# Patient Record
Sex: FEMALE | Race: WHITE | Employment: FULL TIME | URBAN - METROPOLITAN AREA
[De-identification: names, ages, dates, MRNs, and addresses within clinical notes are randomized per-mention and may not be internally consistent; named-entity substitution may affect disease eponyms.]

---

## 2017-02-03 ENCOUNTER — OFFICE VISIT (OUTPATIENT)
Dept: ALLERGY | Facility: CLINIC | Age: 32
End: 2017-02-03
Payer: COMMERCIAL

## 2017-02-03 VITALS
HEART RATE: 75 BPM | HEIGHT: 65 IN | BODY MASS INDEX: 24.43 KG/M2 | SYSTOLIC BLOOD PRESSURE: 108 MMHG | DIASTOLIC BLOOD PRESSURE: 72 MMHG | OXYGEN SATURATION: 98 % | WEIGHT: 146.6 LBS

## 2017-02-03 DIAGNOSIS — J45.41 MODERATE PERSISTENT ASTHMA WITH ACUTE EXACERBATION: Primary | ICD-10-CM

## 2017-02-03 DIAGNOSIS — M05.79 SEROPOSITIVE RHEUMATOID ARTHRITIS OF MULTIPLE SITES (H): Primary | ICD-10-CM

## 2017-02-03 DIAGNOSIS — J30.81 NON-SEASONAL ALLERGIC RHINITIS DUE TO ANIMAL HAIR AND DANDER: ICD-10-CM

## 2017-02-03 DIAGNOSIS — Z79.890 NEED FOR PROPHYLACTIC HORMONE REPLACEMENT THERAPY (POSTMENOPAUSAL): ICD-10-CM

## 2017-02-03 LAB
ALT SERPL W P-5'-P-CCNC: 34 U/L (ref 0–50)
AST SERPL W P-5'-P-CCNC: 21 U/L (ref 0–45)
CRP SERPL-MCNC: <2.9 MG/L (ref 0–8)
ERYTHROCYTE [DISTWIDTH] IN BLOOD BY AUTOMATED COUNT: 11.9 % (ref 10–15)
ERYTHROCYTE [SEDIMENTATION RATE] IN BLOOD BY WESTERGREN METHOD: 6 MM/H (ref 0–20)
FEF 25/75: NORMAL
FEV-1: NORMAL
FEV1/FVC: NORMAL
FVC: NORMAL
HCT VFR BLD AUTO: 40.5 % (ref 35–47)
HGB BLD-MCNC: 13.8 G/DL (ref 11.7–15.7)
MCH RBC QN AUTO: 32.5 PG (ref 26.5–33)
MCHC RBC AUTO-ENTMCNC: 34.1 G/DL (ref 31.5–36.5)
MCV RBC AUTO: 96 FL (ref 78–100)
PLATELET # BLD AUTO: 268 10E9/L (ref 150–450)
RBC # BLD AUTO: 4.24 10E12/L (ref 3.8–5.2)
WBC # BLD AUTO: 9.5 10E9/L (ref 4–11)

## 2017-02-03 PROCEDURE — 94010 BREATHING CAPACITY TEST: CPT | Performed by: ALLERGY & IMMUNOLOGY

## 2017-02-03 PROCEDURE — 99214 OFFICE O/P EST MOD 30 MIN: CPT | Mod: 25 | Performed by: ALLERGY & IMMUNOLOGY

## 2017-02-03 PROCEDURE — 84450 TRANSFERASE (AST) (SGOT): CPT

## 2017-02-03 PROCEDURE — 85652 RBC SED RATE AUTOMATED: CPT

## 2017-02-03 PROCEDURE — 85027 COMPLETE CBC AUTOMATED: CPT

## 2017-02-03 PROCEDURE — 86140 C-REACTIVE PROTEIN: CPT

## 2017-02-03 PROCEDURE — 84460 ALANINE AMINO (ALT) (SGPT): CPT

## 2017-02-03 PROCEDURE — 36415 COLL VENOUS BLD VENIPUNCTURE: CPT

## 2017-02-03 RX ORDER — MONTELUKAST SODIUM 10 MG/1
10 TABLET ORAL AT BEDTIME
Qty: 30 TABLET | Refills: 3 | Status: SHIPPED | OUTPATIENT
Start: 2017-02-03 | End: 2018-01-08

## 2017-02-03 RX ORDER — FLUTICASONE PROPIONATE AND SALMETEROL XINAFOATE 230; 21 UG/1; UG/1
2 AEROSOL, METERED RESPIRATORY (INHALATION) 2 TIMES DAILY
Qty: 1 INHALER | Refills: 0 | Status: SHIPPED | OUTPATIENT
Start: 2017-02-03 | End: 2017-03-31

## 2017-02-03 ASSESSMENT — ENCOUNTER SYMPTOMS
WHEEZING: 0
RHINORRHEA: 1
SINUS PRESSURE: 0
ADENOPATHY: 0
EYE REDNESS: 0
EYE ITCHING: 0
EYE DISCHARGE: 0
SHORTNESS OF BREATH: 0
COUGH: 1
CHILLS: 0
FACIAL SWELLING: 0
NAUSEA: 0
CHEST TIGHTNESS: 0
VOMITING: 0
DIARRHEA: 0
FEVER: 0
HEADACHES: 0
ACTIVITY CHANGE: 0
ARTHRALGIAS: 0
MYALGIAS: 0

## 2017-02-03 NOTE — Clinical Note
My Asthma Action Plan  Name: Suha Caldera   YOB: 1985  Date: 02/03/2017   My doctor: [facility]   My clinic: [facility]      My Control Medicine: Fluticasone+salmeterol (Advair) -  /21 mcg 2 puffs twice a day montelukast 10 mg by mouth once a day     My Rescue Medicine: Albuterol (Proair/Ventolin/Proventil) HFA        Dose: 2 puffs every 4 hours as needed    My Asthma Severity: moderate persistent  Avoid your asthma triggers: smoke, upper respiratory infections, animal dander and exercise or sports        GREEN ZONE   Good Control    I feel good    No cough or wheeze    Can work, sleep and play without asthma symptoms       Take your asthma control medicine every day.     1. If exercise triggers your asthma, take your rescue medication    15 minutes before exercise or sports, and    During exercise if you have asthma symptoms  2. Spacer to use with inhaler: If you have a spacer, make sure to use it with your inhaler             YELLOW ZONE Getting Worse  I have ANY of these:    I do not feel good    Cough or wheeze    Chest feels tight    Wake up at night   1. Keep taking your Green Zone medications  2. Start taking your rescue medicine:    every 20 minutes for up to 1 hour. Then every 4 hours for 24-48 hours.  3. If you stay in the Yellow Zone for more than 12-24 hours, contact your doctor.             RED ZONE Medical Alert - Get Help  I have ANY of these:    I feel awful    Medicine is not helping    Breathing getting harder    Trouble walking or talking    Nose opens wide to breathe       1. Take your rescue medicine NOW  2. If your provider has prescribed an oral steroid medicine, start taking it NOW  3. Call your doctor NOW  4. If you are still in the Red Zone after 20 minutes and you have not reached your doctor:    Take your rescue medicine again and    Call 911 or go to the emergency room right away    See your regular doctor within 2 weeks of an  Emergency Room or Urgent Care visit for follow-up treatment.        The above medication may be given at school or day care?:  N/A (Adult Patient)  Child can carry and use inhaler(s) at school with approval of school nurse?: Yes and N/A (Adult Patient)    Electronically signed by: Kofi Broussard, November 11, 2016    Annual Reminders:  Meet with Asthma Educator,  Flu Shot in the Fall, consider Pneumonia Vaccination for patients with asthma (aged 19 and older).    Pharmacy: [id number] [place] [phone]                    Asthma Triggers  How To Control Things That Make Your Asthma Worse    Triggers are things that make your asthma worse.  Look at the list below to help you find your triggers and what you can do about them.  You can help prevent asthma flare-ups by staying away from your triggers.      Trigger                                                          What you can do   Cigarette Smoke  Tobacco smoke can make asthma worse. Do not allow smoking in your home, car or around you.  Be sure no one smokes at a child s day care or school.  If you smoke, ask your health care provider for ways to help you quit.  Ask family members to quit too.  Ask your health care provider for a referral to Quit Plan to help you quit smoking, or call 8-740-586-PLAN.     Colds, Flu, Bronchitis  These are common triggers of asthma. Wash your hands often.  Don t touch your eyes, nose or mouth.  Get a flu shot every year.     Dust Mites  These are tiny bugs that live in cloth or carpet. They are too small to see. Wash sheets and blankets in hot water every week.   Encase pillows and mattress in dust mite proof covers.  Avoid having carpet if you can. If you have carpet, vacuum weekly.   Use a dust mask and HEPA vacuum.   Pollen and Outdoor Mold  Some people are allergic to trees, grass, or weed pollen, or molds. Try to keep your windows closed.  Limit time out doors when pollen count is high.   Ask you  health care provider about taking medicine during allergy season.     Animal Dander  Some people are allergic to skin flakes, urine or saliva from pets with fur or feathers. Keep pets with fur or feathers out of your home.    If you can t keep the pet outdoors, then keep the pet out of your bedroom.  Keep the bedroom door closed.  Keep pets off cloth furniture and away from stuffed toys.     Mice, Rats, and Cockroaches  Some people are allergic to the waste from these pests.   Cover food and garbage.  Clean up spills and food crumbs.  Store grease in the refrigerator.   Keep food out of the bedroom.   Indoor Mold  This can be a trigger if your home has high moisture. Fix leaking faucets, pipes, or other sources of water.   Clean moldy surfaces.  Dehumidify basement if it is damp and smelly.   Smoke, Strong Odors, and Sprays  These can reduce air quality. Stay away from strong odors and sprays, such as perfume, powder, hair spray, paints, smoke incense, paint, cleaning products, candles and new carpet.   Exercise or Sports  Some people with asthma have this trigger. Be active!  Ask your doctor about taking medicine before sports or exercise to prevent symptoms.    Warm up for 5-10 minutes before and after sports or exercise.     Other Triggers of Asthma  Cold air:  Cover your nose and mouth with a scarf.  Sometimes laughing or crying can be a trigger.  Some medicines and food can trigger asthma.

## 2017-02-03 NOTE — PROGRESS NOTES
SUBJECTIVE:                                                               Suha Caldera presents today to our Allergy Clinic at [facility] for a follow up visit.  As you know, she is a 31 year old female with asthma and allergic rhinitis.  In regards to allergic rhinitis, she has been using Qvar 80 mcg 1 puff in each nostril once a day, azelastine on as needed basis, and cetirizine on an as-needed basis. Has mild nasal congestion and clear rhinorrhea in light of a viral infection. But otherwise is doing well.   The patient denies interval sinusitis symptoms of fever, facial pain or purulent rhinorrhea.  She states that her pets do not have access into her bedroom.    In regards to her asthma, she is 7/7 days compliant with Advair 230/21 mcg 2 puffs twice a day, montelukast 10 mg by mouth daily and albuterol inhaler as needed. She has improved. Uses albuterol less than twice a week. Had one night awakening due to chest symptoms, approximately 3 weeks ago. Despite having some mild occasional cough with her viral infection, she denies chest tightness, wheezing shortness of breath. No ED/urgent care/PCP visits for chest symptoms since the last time she was seen in our clinic. No additional oral steroids were taken since she was seen in our clinic in November 2016.    Patient Active Problem List   Diagnosis     Moderate persistent asthma     AR (allergic rhinitis)     Rheumatoid arthritis (H)     Headache     Migraine without aura       Past Medical History   Diagnosis Date     RA (rheumatoid arthritis) (H)       Problem (# of Occurrences) Relation (Name,Age of Onset)    Alcoholism (1) Father    Bronchitis (1) Mother    Lupus (1) Mother        Past Surgical History   Procedure Laterality Date     Appendectomy       Social History     Social History     Marital Status:      Spouse Name: N/A     Number of Children: N/A     Years of Education: N/A     Social History Main Topics     Smoking status:  Never Smoker      Smokeless tobacco: Never Used     Alcohol Use: 0.0 oz/week     0 Standard drinks or equivalent per week      Comment: socially     Drug Use: No     Sexual Activity: Not Asked     Other Topics Concern     None     Social History Narrative    ENVIRONMENTAL HISTORY: [place] The home is heated with a forced air and gas furnace. It does have central air conditioning. The patient's bedroom is furnished with hard jennie in bedroom, allergen mattress cover and allergen pillowcase cover.  Pets inside the house include 1 bunny, 1 cat and 1 dog. There is not history of cockroach or mice infestation. There are no smokers in the house.  The house does not have a damp basement.                Review of Systems   Constitutional: Negative for fever, chills and activity change.   HENT: Positive for congestion and rhinorrhea. Negative for ear discharge, facial swelling, nosebleeds, postnasal drip, sinus pressure and sneezing.    Eyes: Negative for discharge, redness and itching.   Respiratory: Positive for cough. Negative for chest tightness, shortness of breath and wheezing.    Cardiovascular: Negative for chest pain.   Gastrointestinal: Negative for nausea, vomiting and diarrhea.   Musculoskeletal: Negative for myalgias and arthralgias.   Skin: Negative for rash.   Allergic/Immunologic: Positive for environmental allergies.   Neurological: Negative for headaches.   Hematological: Negative for adenopathy.   Psychiatric/Behavioral: Negative for behavioral problems and self-injury.           Current outpatient prescriptions:      fluticasone-salmeterol (ADVAIR-HFA) 230-21 MCG/ACT inhaler, Inhale 2 puffs into the lungs 2 times daily, Disp: 1 Inhaler, Rfl: 0     montelukast (SINGULAIR) 10 MG tablet, Take 1 tablet (10 mg) by mouth At Bedtime, Disp: 30 tablet, Rfl: 3     beclomethasone (QVAR) 80 MCG/ACT Inhaler, 1 puff into both nares twice daily with adaptor Needs to be  "seen for further refills, Disp: 1 Inhaler, Rfl: 1     cetirizine (ZYRTEC) 10 MG tablet, Take 1 tablet (10 mg) by mouth every evening, Disp: 30 tablet, Rfl: 1     Abatacept (ORENCIA) 125 MG/ML SOLN, Inject 10 mLs Subcutaneous every 7 days, Disp: , Rfl:      azelastine (ASTELIN) 0.1 % nasal spray, Spray 2 sprays into both nostrils 2 times daily as needed for rhinitis, Disp: 1 Bottle, Rfl: 1     [DISCONTINUED] montelukast (SINGULAIR) 10 MG tablet, Take 1 tablet (10 mg) by mouth At Bedtime, Disp: 30 tablet, Rfl: 3     [DISCONTINUED] fluticasone-salmeterol (ADVAIR-HFA) 230-21 MCG/ACT inhaler, Inhale 2 puffs into the lungs 2 times daily, Disp: 1 Inhaler, Rfl: 0     albuterol (VENTOLIN HFA) 108 (90 BASE) MCG/ACT inhaler, Inhale 2 puffs into the lungs every 4 hours as needed 2 puffs every 4 hours for cough or wheeze.  2 puffs 15 minutes before exercise., Disp: 1 Inhaler, Rfl: 2  No current facility-administered medications for this visit.    Facility-Administered Medications Ordered in Other Visits:      lidocaine 1 % injection, , , PRN, Jonny Mayer, JEANNA CRNA, 5 mL at 04/19/15 1723  Immunization History   Administered Date(s) Administered     Hepatitis B 07/27/1998, 09/30/1998, 02/01/1999     Human Papilloma Virus 02/13/2009, 04/30/2009, 08/31/2009     No Known Allergies  OBJECTIVE:                                                                 /72 mmHg  Pulse 75  Ht 5' 4.75\" (1.645 m)  Wt 146 lb 9.6 oz (66.497 kg)  BMI 24.57 kg/m2  SpO2 98%        Physical Exam   Constitutional: She is oriented to person, place, and time. No distress.   HENT:   Head: Normocephalic and atraumatic.   Right Ear: Tympanic membrane, external ear and ear canal normal.   Left Ear: Tympanic membrane, external ear and ear canal normal.   Nose: No mucosal edema or rhinorrhea.   Mouth/Throat: Oropharynx is clear and moist and mucous membranes are normal.   Eyes: Conjunctivae are normal. Right eye exhibits no discharge. Left eye " exhibits no discharge.   Neck: Normal range of motion.   Cardiovascular: Normal rate, regular rhythm and normal heart sounds.    No murmur heard.  Pulmonary/Chest: Effort normal and breath sounds normal. No respiratory distress. She has no wheezes. She has no rales.   Musculoskeletal: Normal range of motion.   Lymphadenopathy:     She has no cervical adenopathy.   Neurological: She is alert and oriented to person, place, and time.   Skin: Skin is warm. No rash noted. She is not diaphoretic.   Psychiatric: Mood, memory and affect normal.   Nursing note and vitals reviewed.        WORKUP:     SPIROMETRY       FVC 4.21L (107% of predicted).     FEV1 3.07L (94% of predicted).     FEV1/FVC 73%     FEF 25%-75%  2.15L/s (61% of predicted).    Office spirometry performed today suggests small airway limitation.    Asthma Control Test (ACT) total score: 22     ASSESSMENT/PLAN:    No problems updated.  Problem List Items Addressed This Visit        Respiratory    1. Moderate persistent asthma - Primary  Significantly improved with proper compliance, currently on Advair 230/21 mcg 2 puffs twice a day, montelukast 10 mg by mouth once a day and albuterol as needed.  -Continue as is.  In the future, considering symptom control, we may consider omalizumab.  Current small airway limitation can be explained by ongoing viral symptoms.  She doesn't consider allergen immunotherapy at this time.    Relevant Medications    fluticasone-salmeterol (ADVAIR-HFA) 230-21 MCG/ACT inhaler    montelukast (SINGULAIR) 10 MG tablet      Other Visit Diagnoses     2. Non-seasonal allergic rhinitis due to animal hair and dander      In general, well controlled with intranasal Qvar, oral cetirizine and intranasal azelastine.  -Continue keeping pets outside of her bedroom.  -Continue medications the way they are right now.     Relevant Medications     fluticasone-salmeterol (ADVAIR-HFA) 230-21 MCG/ACT inhaler     montelukast (SINGULAIR) 10 MG tablet              Follow up in 3 months or sooner if needed.      Thank you for allowing us to participate in the care of this patient. Please feel free to contact us if there are any questions or concerns about the patient.    Disclaimer: This note consists of symbols derived from keyboarding, dictation and/or voice recognition software. As a result, there may be errors in the script that have gone undetected. Please consider this when interpreting information found in this chart.    Kofi Broussard MD   Allergy, Asthma and Immunology  [place], [place] and [place]

## 2017-02-03 NOTE — NURSING NOTE
"Chief Complaint   Patient presents with     Asthma Recheck     2 month follow up. Has had a cold X2wks       Initial /72 mmHg  Pulse 75  Ht 5' 4.75\" (1.645 m)  Wt 146 lb 9.6 oz (66.497 kg)  BMI 24.57 kg/m2  SpO2 98% Estimated body mass index is 24.57 kg/(m^2) as calculated from the following:    Height as of this encounter: 5' 4.75\" (1.645 m).    Weight as of this encounter: 146 lb 9.6 oz (66.497 kg).  BP completed using cuff size: regular    "

## 2017-02-04 ASSESSMENT — ASTHMA QUESTIONNAIRES: ACT_TOTALSCORE: 22

## 2017-02-07 DIAGNOSIS — Z79.899 LONG TERM USE OF DRUG: Primary | ICD-10-CM

## 2017-02-07 DIAGNOSIS — M06.9 RA (RHEUMATOID ARTHRITIS) (H): ICD-10-CM

## 2017-03-31 DIAGNOSIS — J45.40 MODERATE PERSISTENT ASTHMA WITHOUT COMPLICATION: Primary | ICD-10-CM

## 2017-03-31 RX ORDER — FLUTICASONE PROPIONATE AND SALMETEROL XINAFOATE 230; 21 UG/1; UG/1
2 AEROSOL, METERED RESPIRATORY (INHALATION) 2 TIMES DAILY
Qty: 1 INHALER | Refills: 2 | Status: SHIPPED | OUTPATIENT
Start: 2017-03-31 | End: 2017-09-18

## 2017-03-31 NOTE — TELEPHONE ENCOUNTER
Refilled Advair 230 per [facility] protocol.  Patient is due for a f/u appointment with Dr. Broussard in May.  Nathalie Carbajal RN

## 2017-08-02 ENCOUNTER — TRANSFERRED RECORDS (OUTPATIENT)
Dept: HEALTH INFORMATION MANAGEMENT | Facility: CLINIC | Age: 32
End: 2017-08-02

## 2017-08-02 LAB
HPV ABSTRACT: ABNORMAL
PAP-ABSTRACT: ABNORMAL

## 2017-09-18 ENCOUNTER — OFFICE VISIT (OUTPATIENT)
Dept: ALLERGY | Facility: CLINIC | Age: 32
End: 2017-09-18
Payer: COMMERCIAL

## 2017-09-18 VITALS
WEIGHT: 147.49 LBS | OXYGEN SATURATION: 98 % | HEART RATE: 75 BPM | TEMPERATURE: 97.6 F | DIASTOLIC BLOOD PRESSURE: 72 MMHG | SYSTOLIC BLOOD PRESSURE: 121 MMHG | HEIGHT: 65 IN | BODY MASS INDEX: 24.57 KG/M2

## 2017-09-18 DIAGNOSIS — J45.40 NOT WELL CONTROLLED MODERATE PERSISTENT ASTHMA: Primary | ICD-10-CM

## 2017-09-18 DIAGNOSIS — J30.81 CHRONIC ALLERGIC RHINITIS DUE TO ANIMAL HAIR AND DANDER: ICD-10-CM

## 2017-09-18 LAB
FEF 25/75: NORMAL
FEV-1: NORMAL
FEV1/FVC: NORMAL
FVC: NORMAL

## 2017-09-18 PROCEDURE — 94060 EVALUATION OF WHEEZING: CPT | Performed by: ALLERGY & IMMUNOLOGY

## 2017-09-18 PROCEDURE — 99214 OFFICE O/P EST MOD 30 MIN: CPT | Mod: 25 | Performed by: ALLERGY & IMMUNOLOGY

## 2017-09-18 RX ORDER — FLUTICASONE PROPIONATE AND SALMETEROL 232; 14 UG/1; UG/1
1 POWDER, METERED RESPIRATORY (INHALATION) 2 TIMES DAILY
Qty: 1 EACH | Refills: 1 | Status: SHIPPED | OUTPATIENT
Start: 2017-09-18 | End: 2017-09-18

## 2017-09-18 RX ORDER — PREDNISONE 20 MG/1
40 TABLET ORAL DAILY
Qty: 10 TABLET | Refills: 0 | Status: SHIPPED | OUTPATIENT
Start: 2017-09-18 | End: 2017-09-23

## 2017-09-18 RX ORDER — FLUTICASONE PROPIONATE AND SALMETEROL XINAFOATE 230; 21 UG/1; UG/1
2 AEROSOL, METERED RESPIRATORY (INHALATION) 2 TIMES DAILY
Qty: 1 INHALER | Refills: 2 | Status: SHIPPED | OUTPATIENT
Start: 2017-09-18 | End: 2017-09-18

## 2017-09-18 RX ORDER — FLUTICASONE PROPIONATE AND SALMETEROL 232; 14 UG/1; UG/1
1 POWDER, METERED RESPIRATORY (INHALATION) 2 TIMES DAILY
Qty: 1 EACH | Refills: 1 | Status: SHIPPED | OUTPATIENT
Start: 2017-09-18 | End: 2017-12-04

## 2017-09-18 ASSESSMENT — ENCOUNTER SYMPTOMS
ACTIVITY CHANGE: 0
WHEEZING: 1
EYE REDNESS: 0
FEVER: 0
COUGH: 1
ARTHRALGIAS: 0
CHILLS: 0
SHORTNESS OF BREATH: 1
EYE ITCHING: 1
ADENOPATHY: 0
MYALGIAS: 0
NAUSEA: 0
SINUS PRESSURE: 0
CHEST TIGHTNESS: 1
FACIAL SWELLING: 0
DIARRHEA: 0
EYE DISCHARGE: 0
RHINORRHEA: 1
VOMITING: 0
HEADACHES: 0

## 2017-09-18 NOTE — PROGRESS NOTES
SUBJECTIVE:                                                               Suha Jean presents today to our Allergy Clinic at  for a follow up visit.  As you know, she is a 32 year old female with with asthma and allergic rhinitis.  As you know, she is a 32-year-old female with asthma and allergic rhinitis.  In regards to her asthma, she has been using Singulair 10 mg by mouth daily. She could not afford Advair. It was $250 for her. She stopped using it about a month ago. Since the last time, February 2017, she did pretty well; however, about 2-3 days ago she developed upper respiratory infection symptoms that triggered cough, chest tightness, shortness of breath and wheezing. She has chest tightness. Pretty much on a daily basis. Uses albuterol daily.    In regards to her allergic rhinitis symptoms, she is using Qvar 80 mcg 1 puff in each nostril once a day, azelastine on as needed basis (rarely), and cetirizine almost daily. Besides having some mild rhinorrhea and sneezing, she denies any other symptoms. No interval sinusitis symptoms or fever, facial pain or purulent rhinorrhea    Patient Active Problem List   Diagnosis     Moderate persistent asthma     Chronic allergic rhinitis due to animal hair and dander     Rheumatoid arthritis (H)     Headache     Migraine without aura       Past Medical History:   Diagnosis Date     RA (rheumatoid arthritis) (H)       Problem (# of Occurrences) Relation (Name,Age of Onset)    Alcoholism (1) Father    Bronchitis (1) Mother    Lupus (1) Mother        Past Surgical History:   Procedure Laterality Date     APPENDECTOMY       Social History     Social History     Marital status:      Spouse name: N/A     Number of children: N/A     Years of education: N/A     Social History Main Topics     Smoking status: Never Smoker     Smokeless tobacco: Never Used     Alcohol use 0.0 oz/week     0 Standard drinks or equivalent per week      Comment: socially     Drug use: No      Sexual activity: Not Asked     Other Topics Concern     None     Social History Narrative    ENVIRONMENTAL HISTORY: [place] The home is heated with a forced air and gas furnace. It does have central air conditioning. The patient's bedroom is furnished with hard jennie in bedroom, allergen mattress cover and allergen pillowcase cover.  Pets inside the house include 1 bunny, 1 cat and 1 dog. There is not history of cockroach or mice infestation. There are no smokers in the house.  The house does not have a damp basement.                Review of Systems   Constitutional: Negative for activity change, chills and fever.   HENT: Positive for rhinorrhea and sneezing. Negative for congestion, ear discharge, facial swelling, nosebleeds, postnasal drip and sinus pressure.    Eyes: Positive for itching. Negative for discharge and redness.   Respiratory: Positive for cough, chest tightness, shortness of breath and wheezing.    Cardiovascular: Negative for chest pain.   Gastrointestinal: Negative for diarrhea, nausea and vomiting.   Musculoskeletal: Negative for arthralgias and myalgias.   Skin: Negative for rash.   Neurological: Negative for headaches.   Hematological: Negative for adenopathy.   Psychiatric/Behavioral: Negative for behavioral problems and self-injury.           Current Outpatient Prescriptions:      levonorgestrel (MIRENA) 20 MCG/24HR IUD, 1 Device by Intrauterine route, Disp: , Rfl:      Fluticasone-Salmeterol 232-14 MCG/ACT AEPB, Inhale 1 puff into the lungs 2 times daily, Disp: 1 each, Rfl: 1     predniSONE (DELTASONE) 20 MG tablet, Take 2 tablets (40 mg) by mouth daily for 5 days, Disp: 10 tablet, Rfl: 0     montelukast (SINGULAIR) 10 MG tablet, Take 1 tablet (10 mg) by mouth At Bedtime, Disp: 30 tablet, Rfl: 3     beclomethasone (QVAR) 80 MCG/ACT Inhaler, 1 puff into both nares twice daily with adaptor Needs to be seen for further refills, Disp: 1 Inhaler,  "Rfl: 1     cetirizine (ZYRTEC) 10 MG tablet, Take 1 tablet (10 mg) by mouth every evening, Disp: 30 tablet, Rfl: 1     albuterol (VENTOLIN HFA) 108 (90 BASE) MCG/ACT inhaler, Inhale 2 puffs into the lungs every 4 hours as needed 2 puffs every 4 hours for cough or wheeze.  2 puffs 15 minutes before exercise., Disp: 1 Inhaler, Rfl: 2     Abatacept (ORENCIA) 125 MG/ML SOLN, Inject 10 mLs Subcutaneous every 7 days, Disp: , Rfl:      [DISCONTINUED] Fluticasone-Salmeterol 232-14 MCG/ACT AEPB, Inhale 1 puff into the lungs 2 times daily, Disp: 1 each, Rfl: 1     [DISCONTINUED] fluticasone-salmeterol (ADVAIR-HFA) 230-21 MCG/ACT inhaler, Inhale 2 puffs into the lungs 2 times daily, Disp: 1 Inhaler, Rfl: 2     [DISCONTINUED] fluticasone-salmeterol (ADVAIR-HFA) 230-21 MCG/ACT inhaler, Inhale 2 puffs into the lungs 2 times daily (Patient not taking: Reported on 9/18/2017), Disp: 1 Inhaler, Rfl: 2     azelastine (ASTELIN) 0.1 % nasal spray, Spray 2 sprays into both nostrils 2 times daily as needed for rhinitis (Patient not taking: Reported on 9/18/2017), Disp: 1 Bottle, Rfl: 1  No current facility-administered medications for this visit.     Facility-Administered Medications Ordered in Other Visits:      lidocaine 1 % injection, , , PRN, Jonny Mayer E, APRN CRNA, 5 mL at 04/19/15 1723  Immunization History   Administered Date(s) Administered     HPV 02/13/2009, 04/30/2009, 08/31/2009     HepB 07/27/1998, 09/30/1998, 02/01/1999     No Known Allergies  OBJECTIVE:                                                                 /72 (BP Location: Right arm, Patient Position: Sitting, Cuff Size: Adult Regular)  Pulse 75  Temp 97.6  F (36.4  C) (Tympanic)  Ht 5' 4.96\" (1.65 m)  Wt 147 lb 7.8 oz (66.9 kg)  SpO2 98%  BMI 24.57 kg/m2        Physical Exam   Constitutional: She is oriented to person, place, and time. No distress.   HENT:   Head: Normocephalic and atraumatic.   Right Ear: Tympanic membrane, external ear and ear " canal normal.   Left Ear: Tympanic membrane, external ear and ear canal normal.   Nose: No mucosal edema or rhinorrhea.   Mouth/Throat: Oropharynx is clear and moist and mucous membranes are normal.   Eyes: Conjunctivae are normal. Right eye exhibits no discharge. Left eye exhibits no discharge.   Neck: Normal range of motion.   Cardiovascular: Normal rate, regular rhythm and normal heart sounds.    No murmur heard.  Pulmonary/Chest: Effort normal and breath sounds normal. No respiratory distress. She has no wheezes. She has no rales.   Musculoskeletal: Normal range of motion.   Lymphadenopathy:     She has no cervical adenopathy.   Neurological: She is alert and oriented to person, place, and time.   Skin: Skin is warm. No rash noted. She is not diaphoretic.   Psychiatric: Mood, memory and affect normal.   Nursing note and vitals reviewed.      WORKUP:  SPIROMETRY  initial FVC 2.68L (68% of predicted); after bronchodilator 3.85L (+43% change)   initial FEV1 1.73L (53% of predicted); after bronchodilator 2.87L (+65% change)  initial FEV1/FVC 65 %; after bronchodilator 74%  initial FEF 25%-75% 1.11L/s (31% of predicted); after bronchodilator 4.18L/s (+275% change)      The office spirometry performed today suggests moderate obstruction with significant reversibility after nebulized albuterol. Prebronchodilator study is worse comparing with previous ones.      Asthma Control Test (ACT) total score: 15        ASSESSMENT/PLAN:    Problem List Items Addressed This Visit        Respiratory    1. Chronic allergic rhinitis due to animal hair and dander  Currently seems to be well controlled with Qvar 80  g 1 puff in each nostril once a day, azelastine on as needed basis and cetirizine on an as needed basis. Apparently, she needs cetirizine daily.  -Continue avoidance measures.  -Continue the same medication regimen. The patient does not consider allergen immunotherapy.    Relevant Medications    Fluticasone-Salmeterol  232-14 MCG/ACT AEPB      Other Visit Diagnoses     2. Not well controlled moderate persistent asthma    -  Primary  -Continue with montelukast 10 mg by mouth daily.  -Start Air Duo Respiclick 232 mcg/14mcg 1 inhalation twice a day. The patient was encouraged to call us in case if controlling medication cost is an issue so we would be able to explore other options.  --Start prednisone 40 mg by mouth once a day for 3 days. Can extend up to 5 days depending on symptoms.     Relevant Medications    levonorgestrel (MIRENA) 20 MCG/24HR IUD    Fluticasone-Salmeterol 232-14 MCG/ACT AEPB    predniSONE (DELTASONE) 20 MG tablet    Other Relevant Orders    ALBUTEROL UNIT DOSE, 1 MG (Completed)    BRONCHODILATION RESPONSE, PRE/POST ADMIN (Completed)          Follow up in 4 weeks or sooner if needed.    Thank you for allowing us to participate in the care of this patient. Please feel free to contact us if there are any questions or concerns about the patient.    Disclaimer: This note consists of symbols derived from keyboarding, dictation and/or voice recognition software. As a result, there may be errors in the script that have gone undetected. Please consider this when interpreting information found in this chart.    Kofi Broussard MD, [facility]  Allergy, Asthma and Immunology  [place], [place] and [place]

## 2017-09-18 NOTE — NURSING NOTE
The following nebulizer treatment was given:     MEDICATION: Albuterol Sulfate 2.5 mg  : [employer]  LOT #: [id number]  EXPIRATION DATE:  Feb 2019  NDC # 4098-6521-26

## 2017-09-18 NOTE — PATIENT INSTRUCTIONS
--Start prednisone 40 mg by mouth once a day for 3 days. Can extend up to 5 days depending on symptoms.   Start Air Duo Respiclick 232 mcg/14mcg 1 inhalation twice a day. Rinse your mouth after using it.   Continue QVAR daily, and cetirizine and azelastine as needed.

## 2017-09-18 NOTE — NURSING NOTE
"Chief Complaint   Patient presents with     Allergy Recheck       Initial /72 (BP Location: Right arm, Patient Position: Sitting, Cuff Size: Adult Regular)  Pulse 75  Temp 97.6  F (36.4  C) (Tympanic)  Ht 5' 4.96\" (1.65 m)  Wt 147 lb 7.8 oz (66.9 kg)  SpO2 98%  BMI 24.57 kg/m2 Estimated body mass index is 24.57 kg/(m^2) as calculated from the following:    Height as of this encounter: 5' 4.96\" (1.65 m).    Weight as of this encounter: 147 lb 7.8 oz (66.9 kg).  Medication Reconciliation: complete    "

## 2017-09-18 NOTE — MR AVS SNAPSHOT
"              After Visit Summary   9/18/2017    Suha Jean    MRN: [id number]           Patient Information     Date Of Birth          1985        Visit Information        Provider Department      9/18/2017 8:20 AM Kofi Broussard MD [facility]        Today's Diagnoses     Not well controlled moderate persistent asthma    -  1    Chronic allergic rhinitis due to animal hair and dander          Care Instructions    --Start prednisone 40 mg by mouth once a day for 3 days. Can extend up to 5 days depending on symptoms.   Start Air Duo Respiclick 232 mcg/14mcg 1 inhalation twice a day. Rinse your mouth after using it.   Continue QVAR daily, and cetirizine and azelastine as needed.           Follow-ups after your visit        Follow-up notes from your care team     Return in about 4 weeks (around 10/16/2017), or if symptoms worsen or fail to improve, for rhinitis follow up, asthma follow up.      Who to contact     If you have questions or need follow up information about today's clinic visit or your schedule please contact [facility] directly at [phone].  Normal or non-critical lab and imaging results will be communicated to you by Widdlehart, letter or phone within 4 business days after the clinic has received the results. If you do not hear from us within 7 days, please contact the clinic through Jack in the Boxt or phone. If you have a critical or abnormal lab result, we will notify you by phone as soon as possible.  Submit refill requests through [employer] or call your pharmacy and they will forward the refill request to us. Please allow 3 business days for your refill to be completed.          Additional Information About Your Visit        MyChart Information     [employer] lets you send messages to your doctor, view your test results, renew your prescriptions, schedule appointments and more. To sign up, go to [email or web address] . Click on \"Log in\" on the left side of the screen, " "which will take you to the Welcome page. Then click on \"Sign up Now\" on the right side of the page.     You will be asked to enter the access code listed below, as well as some personal information. Please follow the directions to create your username and password.     Your access code is: [email or web address]  Expires: 2017  8:54 AM     Your access code will  in 90 days. If you need help or a new code, please call your [place] clinic or [phone].        Care EveryWhere ID     This is your Care EveryWhere ID. This could be used by other organizations to access your [place] medical records  [id number]        Your Vitals Were     Pulse Temperature Height Pulse Oximetry BMI (Body Mass Index)       75 97.6  F (36.4  C) (Tympanic) 5' 4.96\" (1.65 m) 98% 24.57 kg/m2        Blood Pressure from Last 3 Encounters:   17 121/72   17 108/72   16 110/72    Weight from Last 3 Encounters:   17 147 lb 7.8 oz (66.9 kg)   17 146 lb 9.6 oz (66.5 kg)   16 145 lb 12.8 oz (66.1 kg)              We Performed the Following     ALBUTEROL UNIT DOSE, 1 MG     BRONCHODILATION RESPONSE, PRE/POST ADMIN          Today's Medication Changes          These changes are accurate as of: 17  8:54 AM.  If you have any questions, ask your nurse or doctor.               Start taking these medicines.        Dose/Directions    Fluticasone-Salmeterol 232-14 MCG/ACT Aepb   Used for:  Not well controlled moderate persistent asthma   Replaces:  fluticasone-salmeterol 230-21 MCG/ACT inhaler   Started by:  Kofi Broussard MD        Dose:  1 puff   Inhale 1 puff into the lungs 2 times daily   Quantity:  1 each   Refills:  1       predniSONE 20 MG tablet   Commonly known as:  DELTASONE   Used for:  Not well controlled moderate persistent asthma   Started by:  Kofi Broussard MD        Dose:  40 mg   Take 2 tablets (40 mg) by mouth daily for 5 days   Quantity:  10 tablet   Refills:  0         Stop taking these " medicines if you haven't already. Please contact your care team if you have questions.     fluticasone-salmeterol 230-21 MCG/ACT inhaler   Commonly known as:  ADVAIR-HFA   Replaced by:  Fluticasone-Salmeterol 232-14 MCG/ACT Aepb   Stopped by:  Kofi Broussard MD                Where to get your medicines      These medications were sent to [id number] - [place], [place] - [address]  [address] [address]    Hours:  Closed 10-16-08 business to [facility] Phone:  [phone]     Fluticasone-Salmeterol 232-14 MCG/ACT Aepb    predniSONE 20 MG tablet                Primary Care Provider Office Phone #    [facility] [phone]       No address on file        Equal Access to Services     BRIGIDA RIZVI : Maurice Sidhu, wachauncey vick, akosua cihldalpeg harrison, glen alvarado. So [facility] [phone].    ATENCIÓN: Si habla español, tiene a roque disposición servicios gratuitos de asistencia lingüística. [facility] [phone].    We comply with applicable federal civil rights laws and [place] laws. We do not discriminate on the basis of race, color, national origin, age, disability sex, sexual orientation or gender identity.            Thank you!     Thank you for choosing [facility]  for your care. Our goal is always to provide you with excellent care. Hearing back from our patients is one way we can continue to improve our services. Please take a few minutes to complete the written survey that you may receive in the mail after your visit with us. Thank you!             Your Updated Medication List - Protect others around you: Learn how to safely use, store and throw away your medicines at www.disposemymeds.org.          This list is accurate as of: 9/18/17  8:54 AM.  Always use your most recent med list.                   Brand Name Dispense Instructions for use Diagnosis    albuterol 108 (90 BASE) MCG/ACT  Inhaler    VENTOLIN HFA    1 Inhaler    Inhale 2 puffs into the lungs every 4 hours as needed 2 puffs every 4 hours for cough or wheeze.  2 puffs 15 minutes before exercise.    Severe persistent asthma       azelastine 0.1 % spray    ASTELIN    1 Bottle    Spray 2 sprays into both nostrils 2 times daily as needed for rhinitis    Moderate persistent asthma with acute exacerbation       beclomethasone 80 MCG/ACT Inhaler    QVAR    1 Inhaler    1 puff into both nares twice daily with adaptor Needs to be seen for further refills    Moderate persistent asthma without complication       cetirizine 10 MG tablet    zyrTEC    30 tablet    Take 1 tablet (10 mg) by mouth every evening    Acute sinusitis with symptoms > 10 days       Fluticasone-Salmeterol 232-14 MCG/ACT Aepb     1 each    Inhale 1 puff into the lungs 2 times daily    Not well controlled moderate persistent asthma       levonorgestrel 20 MCG/24HR IUD    MIRENA     1 Device by Intrauterine route    Not well controlled moderate persistent asthma       montelukast 10 MG tablet    SINGULAIR    30 tablet    Take 1 tablet (10 mg) by mouth At Bedtime        ORENCIA 125 MG/ML Sosy   Generic drug:  Abatacept      Inject 10 mLs Subcutaneous every 7 days        predniSONE 20 MG tablet    DELTASONE    10 tablet    Take 2 tablets (40 mg) by mouth daily for 5 days    Not well controlled moderate persistent asthma

## 2017-09-19 ASSESSMENT — ASTHMA QUESTIONNAIRES: ACT_TOTALSCORE: 15

## 2017-12-04 DIAGNOSIS — J45.40 NOT WELL CONTROLLED MODERATE PERSISTENT ASTHMA: ICD-10-CM

## 2017-12-04 RX ORDER — FLUTICASONE PROPIONATE AND SALMETEROL 232; 14 UG/1; UG/1
1 POWDER, METERED RESPIRATORY (INHALATION) 2 TIMES DAILY
Qty: 1 EACH | Refills: 1 | Status: SHIPPED | OUTPATIENT
Start: 2017-12-04 | End: 2018-01-03

## 2017-12-04 NOTE — TELEPHONE ENCOUNTER
Fluticasone-Salmeterol 232-14 MCG/ACT        Last Written Prescription Date: 9/18/17  Last Fill Quantity: 1 each, # refills: 1  Last Office Visit with [facility], P or [facility] prescribing provider:  9/18/17  Future Office Visit:   No Follow-up appointment scheduled.    Date of Last Asthma Action Plan Letter:   Asthma Action Plan Q1 Year    Topic Date Due     Asthma Action Plan - yearly  02/03/2018      Asthma Control Test:   ACT Total Scores 9/18/2017   ACT TOTAL SCORE -   ASTHMA ER VISITS -   ASTHMA HOSPITALIZATIONS -   ACT TOTAL SCORE (Goal Greater than or Equal to 20) 15   In the past 12 months, how many times did you visit the emergency room for your asthma without being admitted to the hospital? 0   In the past 12 months, how many times were you hospitalized overnight because of your asthma? 0       Date of Last Spirometry Test:   No results found for this or any previous visit.    Routing refill request to provider for review/approval because:  Patient needs to be seen because:  ACT 15 on 9/18/17 office visit. 4 week or sooner follow-up recommended (10/16/17)  Please advise.    Erinn Arenas, RN

## 2017-12-04 NOTE — TELEPHONE ENCOUNTER
I will prescribe one refill. My experience is th there is a back order for this medication.   The patient was recommended for weeks follow-up. I will not prescribe further until she is seen.

## 2017-12-04 NOTE — LETTER
[facility]  Allergy & Asthma Clinic  [address] [address]  [phone]      Suha Jean  [address] [address]        December 6, 2017      Dear Suha Jean,      We received a request to refill Fluticasone-Salmeterol 232-14 MCG/ACT .  This medication request has been filled for a 1 month supply.  Our records indicate that you are due for a follow up with Dr. Broussard for your allergy and/or asthma care. Please contact our office at [phone], to schedule this appointment at your earliest convenience.        Sincerely,      Your [facility] care team

## 2017-12-06 NOTE — TELEPHONE ENCOUNTER
Mailed letter notifying patient of 1 month refill and need for f/u appointment for further refills.  Nathalie Carbajal RN

## 2017-12-29 ENCOUNTER — TELEPHONE (OUTPATIENT)
Dept: ALLERGY | Facility: CLINIC | Age: 32
End: 2017-12-29

## 2017-12-29 DIAGNOSIS — J45.40 NOT WELL CONTROLLED MODERATE PERSISTENT ASTHMA: ICD-10-CM

## 2017-12-29 DIAGNOSIS — J45.40 MODERATE PERSISTENT ASTHMA WITHOUT COMPLICATION: Primary | ICD-10-CM

## 2017-12-29 NOTE — TELEPHONE ENCOUNTER
"Last OV 9/18/17.  Please see note below from pharmacy.    Per last OV note: \"Start Air Duo Respiclick 232 mcg/14mcg 1 inhalation twice a day. The patient was encouraged to call us in case if controlling medication cost is an issue so we would be able to explore other options.\"    Please advise.  Nathalie Carbajal RN    "

## 2017-12-29 NOTE — TELEPHONE ENCOUNTER
"[facility] pharmacy faxed request stating \" pt requests new rx: generic airduo respiclick is not available , brand copay is $93.19, is there a less expensive alternative?\"    Please send in new rx    Niyah Hoang  Specialty CSS    "

## 2017-12-31 NOTE — TELEPHONE ENCOUNTER
I am not sure what the cost of Dulera would be, but I prescribed Dulera 200/5 2 puffs twice daily to be used with chamber device.

## 2018-01-03 RX ORDER — FLUTICASONE PROPIONATE AND SALMETEROL 232; 14 UG/1; UG/1
1 POWDER, METERED RESPIRATORY (INHALATION) 2 TIMES DAILY
Qty: 3 EACH | Refills: 0 | Status: SHIPPED | OUTPATIENT
Start: 2018-01-03 | End: 2018-01-08

## 2018-01-03 NOTE — TELEPHONE ENCOUNTER
Patient called back and states that fluticasone/salmeterol has been working well for her.  She said that she talked with [email or web address] and she could get a 3 month supply of generic airduo for the price of 1 inhaler if she filled thru the mail order pharmacy.  3 month Rx sent to [facility] per patient request.  Nathalie Carbajal RN

## 2018-01-03 NOTE — TELEPHONE ENCOUNTER
Pt calling stating she is needing any inhaler [facility].   [facility] day [facility]. She has an appt on 1/8     Niyah [facility]

## 2018-01-03 NOTE — TELEPHONE ENCOUNTER
Left message for patient to call back.  I just want to be sure that she wants generic [facility]? (and not Dulera? This was suggested by Dr. Broussard as an alternative med since Airduo was expensive).  I don't want the patient to end up with a 3 month supply of the wrong med.  Thanks,    Nathalie Carbajal, RN

## 2018-01-08 ENCOUNTER — OFFICE VISIT (OUTPATIENT)
Dept: ALLERGY | Facility: CLINIC | Age: 33
End: 2018-01-08
Payer: COMMERCIAL

## 2018-01-08 VITALS
OXYGEN SATURATION: 98 % | RESPIRATION RATE: 16 BRPM | DIASTOLIC BLOOD PRESSURE: 72 MMHG | SYSTOLIC BLOOD PRESSURE: 110 MMHG | BODY MASS INDEX: 26.04 KG/M2 | HEART RATE: 71 BPM | TEMPERATURE: 98.2 F | WEIGHT: 156.31 LBS

## 2018-01-08 DIAGNOSIS — H10.13 ALLERGIC CONJUNCTIVITIS, BILATERAL: ICD-10-CM

## 2018-01-08 DIAGNOSIS — J30.81 CHRONIC ALLERGIC RHINITIS DUE TO ANIMAL HAIR AND DANDER: ICD-10-CM

## 2018-01-08 DIAGNOSIS — J45.40 NOT WELL CONTROLLED MODERATE PERSISTENT ASTHMA: Primary | ICD-10-CM

## 2018-01-08 DIAGNOSIS — Z79.899 LONG TERM USE OF DRUG: ICD-10-CM

## 2018-01-08 DIAGNOSIS — M06.9 RA (RHEUMATOID ARTHRITIS) (H): ICD-10-CM

## 2018-01-08 LAB
ALBUMIN SERPL-MCNC: 4 G/DL (ref 3.4–5)
ALT SERPL W P-5'-P-CCNC: 24 U/L (ref 0–50)
AST SERPL W P-5'-P-CCNC: 17 U/L (ref 0–45)
CREAT SERPL-MCNC: 0.58 MG/DL (ref 0.52–1.04)
CRP SERPL-MCNC: <2.9 MG/L (ref 0–8)
ERYTHROCYTE [DISTWIDTH] IN BLOOD BY AUTOMATED COUNT: 11.6 % (ref 10–15)
ERYTHROCYTE [SEDIMENTATION RATE] IN BLOOD BY WESTERGREN METHOD: 6 MM/H (ref 0–20)
FEF 25/75: NORMAL
FEV-1: NORMAL
FEV1/FVC: NORMAL
FVC: NORMAL
GFR SERPL CREATININE-BSD FRML MDRD: >90 ML/MIN/1.7M2
HCT VFR BLD AUTO: 40.6 % (ref 35–47)
HGB BLD-MCNC: 14.3 G/DL (ref 11.7–15.7)
MCH RBC QN AUTO: 33.5 PG (ref 26.5–33)
MCHC RBC AUTO-ENTMCNC: 35.2 G/DL (ref 31.5–36.5)
MCV RBC AUTO: 95 FL (ref 78–100)
PLATELET # BLD AUTO: 242 10E9/L (ref 150–450)
RBC # BLD AUTO: 4.27 10E12/L (ref 3.8–5.2)
WBC # BLD AUTO: 9.2 10E9/L (ref 4–11)

## 2018-01-08 PROCEDURE — 84460 ALANINE AMINO (ALT) (SGPT): CPT | Performed by: INTERNAL MEDICINE

## 2018-01-08 PROCEDURE — 99214 OFFICE O/P EST MOD 30 MIN: CPT | Mod: 25 | Performed by: ALLERGY & IMMUNOLOGY

## 2018-01-08 PROCEDURE — 85652 RBC SED RATE AUTOMATED: CPT | Performed by: INTERNAL MEDICINE

## 2018-01-08 PROCEDURE — 82565 ASSAY OF CREATININE: CPT | Performed by: INTERNAL MEDICINE

## 2018-01-08 PROCEDURE — 85027 COMPLETE CBC AUTOMATED: CPT | Performed by: INTERNAL MEDICINE

## 2018-01-08 PROCEDURE — 82040 ASSAY OF SERUM ALBUMIN: CPT | Performed by: INTERNAL MEDICINE

## 2018-01-08 PROCEDURE — 86140 C-REACTIVE PROTEIN: CPT | Performed by: INTERNAL MEDICINE

## 2018-01-08 PROCEDURE — 94010 BREATHING CAPACITY TEST: CPT | Performed by: ALLERGY & IMMUNOLOGY

## 2018-01-08 PROCEDURE — 84450 TRANSFERASE (AST) (SGOT): CPT | Performed by: INTERNAL MEDICINE

## 2018-01-08 PROCEDURE — 36415 COLL VENOUS BLD VENIPUNCTURE: CPT | Performed by: INTERNAL MEDICINE

## 2018-01-08 RX ORDER — MONTELUKAST SODIUM 10 MG/1
10 TABLET ORAL AT BEDTIME
Qty: 90 TABLET | Refills: 3 | Status: SHIPPED | OUTPATIENT
Start: 2018-01-08

## 2018-01-08 RX ORDER — AZELASTINE HYDROCHLORIDE 0.5 MG/ML
1 SOLUTION/ DROPS OPHTHALMIC 2 TIMES DAILY PRN
Qty: 1 BOTTLE | Refills: 0 | Status: SHIPPED | OUTPATIENT
Start: 2018-01-08 | End: 2019-09-03

## 2018-01-08 RX ORDER — BUDESONIDE AND FORMOTEROL FUMARATE DIHYDRATE 160; 4.5 UG/1; UG/1
2 AEROSOL RESPIRATORY (INHALATION) 2 TIMES DAILY
Qty: 1 INHALER | Refills: 1 | Status: SHIPPED | OUTPATIENT
Start: 2018-01-08 | End: 2018-04-23

## 2018-01-08 ASSESSMENT — ENCOUNTER SYMPTOMS
VOMITING: 0
ADENOPATHY: 0
COUGH: 1
JOINT SWELLING: 0
FEVER: 0
NAUSEA: 0
CHILLS: 0
EYE ITCHING: 1
HEADACHES: 0
FACIAL SWELLING: 0
MYALGIAS: 0
SINUS PRESSURE: 0
SHORTNESS OF BREATH: 0
EYE DISCHARGE: 0
WHEEZING: 0
CHEST TIGHTNESS: 0
RHINORRHEA: 0
EYE REDNESS: 0
ACTIVITY CHANGE: 0
DIARRHEA: 0
ARTHRALGIAS: 0

## 2018-01-08 NOTE — LETTER
1/8/2018         RE: Suha Jean  [address] [address]        Dear Colleague,    Thank you for referring your patient, Suha Jean, to the [facility]. Please see a copy of my visit note below.    SUBJECTIVE:                                                                 Suha Jean presents today to our Allergy Clinic at  for a follow up visit.  As you know, she is a 32 year old female with with asthma and allergic rhinitis.  As you know, she is a 32-year-old female with asthma and allergic rhinitis.    In November 2016, elevated total serum IgE noted (213KIU/L), and Significantly positive IgE for cat, dog and horse.    She found a new home for bunny. She is trying to find a new home for her cat.      In regards to allergic rhinoconjunctivitis, she has been using Qvar 80 mcg 1 puff in each nostril once-twice daily and cetirizine pretty much on a daily basis.  She rarely uses azelastine.  Besides occasional sneezing, she denies rhinorrhea, nasal congestion, nasal itchiness or interval sinusitis symptoms or fever, facial pain or purulent rhinorrhea.  Occasionally, she has itching eyes that do not resolve with cetirizine.    In regards to her asthma, she ran out of montelukast.  She has not used it since September 2017.  She did relatively well with generic AirDuo Respiclick 232/14; however, it has been on back order and brand AirDuo was too expensive for her.  She has not been using it since mid December.  Despite that, she has not been having any wheezing, chest tightness or shortness of breath.  She uses albuterol inhaler twice a day instead of  controller inhaler and pretty exertionally.  So far, it has been successful.  She denies any night awakenings. She has not required prednisone since the last visit, except the one that I prescribed her on that day visit.         Patient Active Problem List   Diagnosis     Moderate persistent asthma     Chronic allergic  rhinitis due to animal hair and dander     Rheumatoid arthritis (H)     Headache     Migraine without aura       Past Medical History:   Diagnosis Date     RA (rheumatoid arthritis) (H)       Problem (# of Occurrences) Relation (Name,Age of Onset)    Alcoholism (1) Father    Bronchitis (1) Mother    Lupus (1) Mother        Past Surgical History:   Procedure Laterality Date     APPENDECTOMY       Social History     Social History     Marital status:      Spouse name: N/A     Number of children: N/A     Years of education: N/A     Social History Main Topics     Smoking status: Never Smoker     Smokeless tobacco: Never Used     Alcohol use 0.0 oz/week     0 Standard drinks or equivalent per week      Comment: socially     Drug use: No     Sexual activity: Not Asked     Other Topics Concern     None     Social History Narrative    1/8/2018        ENVIRONMENTAL HISTORY: [place] The home is heated with a forced air and gas furnace. It does have central air conditioning. The patient's bedroom is furnished with hard jennie in bedroom, allergen mattress cover and allergen pillowcase cover.  Pets inside the house include 1 cat and 1 dog. There is no history of cockroach or mice infestation. There are no smokers in the house.  The house does not have a damp basement. No bunny since end of 2017.               Review of Systems   Constitutional: Negative for activity change, chills and fever.   HENT: Positive for sneezing. Negative for congestion, dental problem, ear pain, facial swelling, nosebleeds, postnasal drip, rhinorrhea and sinus pressure.    Eyes: Positive for itching. Negative for discharge and redness.   Respiratory: Positive for cough. Negative for chest tightness, shortness of breath and wheezing.    Cardiovascular: Negative for chest pain.   Gastrointestinal: Negative for diarrhea, nausea and vomiting.   Musculoskeletal: Negative for arthralgias, joint swelling  and myalgias.   Skin: Negative for rash.   Neurological: Negative for headaches.   Hematological: Negative for adenopathy.   Psychiatric/Behavioral: Negative for behavioral problems and self-injury.           Current Outpatient Prescriptions:      montelukast (SINGULAIR) 10 MG tablet, Take 1 tablet (10 mg) by mouth At Bedtime, Disp: 90 tablet, Rfl: 3     budesonide-formoterol (SYMBICORT) 160-4.5 MCG/ACT Inhaler, Inhale 2 puffs into the lungs 2 times daily, Disp: 1 Inhaler, Rfl: 1     azelastine (OPTIVAR) 0.05 % SOLN ophthalmic solution, Apply 1 drop to eye 2 times daily as needed, Disp: 1 Bottle, Rfl: 0     levonorgestrel (MIRENA) 20 MCG/24HR IUD, 1 Device by Intrauterine route, Disp: , Rfl:      beclomethasone (QVAR) 80 MCG/ACT Inhaler, 1 puff into both nares twice daily with adaptor Needs to be seen for further refills, Disp: 1 Inhaler, Rfl: 1     cetirizine (ZYRTEC) 10 MG tablet, Take 1 tablet (10 mg) by mouth every evening, Disp: 30 tablet, Rfl: 1     albuterol (VENTOLIN HFA) 108 (90 BASE) MCG/ACT inhaler, Inhale 2 puffs into the lungs every 4 hours as needed 2 puffs every 4 hours for cough or wheeze.  2 puffs 15 minutes before exercise., Disp: 1 Inhaler, Rfl: 2     Abatacept (ORENCIA) 125 MG/ML SOLN, Inject 10 mLs Subcutaneous every 7 days, Disp: , Rfl:      [DISCONTINUED] montelukast (SINGULAIR) 10 MG tablet, Take 1 tablet (10 mg) by mouth At Bedtime (Patient not taking: Reported on 1/8/2018), Disp: 30 tablet, Rfl: 3     azelastine (ASTELIN) 0.1 % nasal spray, Spray 2 sprays into both nostrils 2 times daily as needed for rhinitis (Patient not taking: Reported on 9/18/2017), Disp: 1 Bottle, Rfl: 1  No current facility-administered medications for this visit.     Facility-Administered Medications Ordered in Other Visits:      lidocaine 1 % injection, , , PRN, Jonny Mayer, APRN CRNA, 5 mL at 04/19/15 1723  Immunization History   Administered Date(s) Administered     HPV 02/13/2009, 04/30/2009, 08/31/2009      HepB 07/27/1998, 09/30/1998, 02/01/1999     No Known Allergies  OBJECTIVE:                                                                 /72 (BP Location: Left arm, Patient Position: Sitting, Cuff Size: Adult Regular)  Pulse 71  Temp 98.2  F (36.8  C) (Oral)  Resp 16  Wt 70.9 kg (156 lb 4.9 oz)  SpO2 98%  BMI 26.04 kg/m2        Physical Exam   Constitutional: No distress.   HENT:   Head: Normocephalic and atraumatic.   Right Ear: Tympanic membrane, external ear and ear canal normal.   Left Ear: Tympanic membrane, external ear and ear canal normal.   Nose: No mucosal edema or rhinorrhea.   Mouth/Throat: Oropharynx is clear and moist and mucous membranes are normal.   Eyes: Conjunctivae are normal. Right eye exhibits no discharge. Left eye exhibits no discharge.   Neck: Normal range of motion.   Cardiovascular: Normal rate, regular rhythm and normal heart sounds.    No murmur heard.  Pulmonary/Chest: Effort normal and breath sounds normal. No respiratory distress. She has no wheezes. She has no rales.   Musculoskeletal: Normal range of motion.   Lymphadenopathy:     She has no cervical adenopathy.   Neurological: She is alert.   Skin: Skin is warm. No rash noted. She is not diaphoretic.   Psychiatric: Affect normal.   Nursing note and vitals reviewed.            WORKUP:   SPIROMETRY       FVC 3.68L (94% of predicted).     FEV1 2.67L (81% of predicted).     FEV1/FVC 72%     FEF 25%-75%  1.79L/s (51% of predicted)    The office spirometry performed today suggests small airway limitation.  There is a decreased FEV1/FVC ratio is well.      Asthma Control Test (ACT) total score: 20     ASSESSMENT/PLAN:    Problem List Items Addressed This Visit        Respiratory    1. Chronic allergic rhinitis due to animal hair and dander  Currently well controlled with Qvar 80 mcg 1 puff in each nostril once-twice daily and cetirizine 10 mg by mouth daily.  She does not consider allergen immunotherapy, being afraid of  needles.  -Continue as is.  If symptoms get worse, she may restart azelastine more consistently.                  Other Visit Diagnoses     2. Not well controlled moderate persistent asthma    -  Primary  Talked with our pharmacy.  Symbicort with coupon will be covered 100% for 1 year.  -Restart montelukast 10 mg by mouth once daily.  -Symbicort 160/4.5 mcg 2 puffs twice daily to be used with chamber device.  -Use albuterol 2-4 puffs every 4 hours as needed for persistent cough/chest tightness/wheezing/shortness of breath.  Asthma action plan was reviewed and provided.      Relevant Medications    montelukast (SINGULAIR) 10 MG tablet    budesonide-formoterol (SYMBICORT) 160-4.5 MCG/ACT Inhaler    Other Relevant Orders    Spirometry, Breathing Capacity (Completed)    3. Allergic conjunctivitis, bilateral  When not controlled with intranasal steroids and cetirizine, use Optivar 1 drop in each eye twice daily as needed.          Relevant Medications    azelastine (OPTIVAR) 0.05 % SOLN ophthalmic solution            Follow up in 6-8 weeks or sooner if needed.    Thank you for allowing us to participate in the care of this patient. Please feel free to contact us if there are any questions or concerns about the patient.    Disclaimer: This note consists of symbols derived from keyboarding, dictation and/or voice recognition software. As a result, there may be errors in the script that have gone undetected. Please consider this when interpreting information found in this chart.    Kofi Broussard MD, [facility]  Allergy, Asthma and Immunology  [place], [place] and [place]      Again, thank you for allowing me to participate in the care of your patient.        Sincerely,        Kofi Broussard MD

## 2018-01-08 NOTE — PROGRESS NOTES
SUBJECTIVE:                                                                 Suha Jean presents today to our Allergy Clinic at  for a follow up visit.  As you know, she is a 32 year old female with with asthma and allergic rhinitis.  As you know, she is a 32-year-old female with asthma and allergic rhinitis.    In November 2016, elevated total serum IgE noted (213KIU/L), and Significantly positive IgE for cat, dog and horse.    She found a new home for bunny. She is trying to find a new home for her cat.      In regards to allergic rhinoconjunctivitis, she has been using Qvar 80 mcg 1 puff in each nostril once-twice daily and cetirizine pretty much on a daily basis.  She rarely uses azelastine.  Besides occasional sneezing, she denies rhinorrhea, nasal congestion, nasal itchiness or interval sinusitis symptoms or fever, facial pain or purulent rhinorrhea.  Occasionally, she has itching eyes that do not resolve with cetirizine.    In regards to her asthma, she ran out of montelukast.  She has not used it since September 2017.  She did relatively well with generic AirDuo Respiclick 232/14; however, it has been on back order and brand AirDuo was too expensive for her.  She has not been using it since mid December.  Despite that, she has not been having any wheezing, chest tightness or shortness of breath.  She uses albuterol inhaler twice a day instead of  controller inhaler and pretty exertionally.  So far, it has been successful.  She denies any night awakenings. She has not required prednisone since the last visit, except the one that I prescribed her on that day visit.         Patient Active Problem List   Diagnosis     Moderate persistent asthma     Chronic allergic rhinitis due to animal hair and dander     Rheumatoid arthritis (H)     Headache     Migraine without aura       Past Medical History:   Diagnosis Date     RA (rheumatoid arthritis) (H)       Problem (# of Occurrences) Relation (Name,Age of Onset)     Alcoholism (1) Father    Bronchitis (1) Mother    Lupus (1) Mother        Past Surgical History:   Procedure Laterality Date     APPENDECTOMY       Social History     Social History     Marital status:      Spouse name: N/A     Number of children: N/A     Years of education: N/A     Social History Main Topics     Smoking status: Never Smoker     Smokeless tobacco: Never Used     Alcohol use 0.0 oz/week     0 Standard drinks or equivalent per week      Comment: socially     Drug use: No     Sexual activity: Not Asked     Other Topics Concern     None     Social History Narrative    1/8/2018        ENVIRONMENTAL HISTORY: [place] The home is heated with a forced air and gas furnace. It does have central air conditioning. The patient's bedroom is furnished with hard jennie in bedroom, allergen mattress cover and allergen pillowcase cover.  Pets inside the house include 1 cat and 1 dog. There is no history of cockroach or mice infestation. There are no smokers in the house.  The house does not have a damp basement. No bunny since end of 2017.               Review of Systems   Constitutional: Negative for activity change, chills and fever.   HENT: Positive for sneezing. Negative for congestion, dental problem, ear pain, facial swelling, nosebleeds, postnasal drip, rhinorrhea and sinus pressure.    Eyes: Positive for itching. Negative for discharge and redness.   Respiratory: Positive for cough. Negative for chest tightness, shortness of breath and wheezing.    Cardiovascular: Negative for chest pain.   Gastrointestinal: Negative for diarrhea, nausea and vomiting.   Musculoskeletal: Negative for arthralgias, joint swelling and myalgias.   Skin: Negative for rash.   Neurological: Negative for headaches.   Hematological: Negative for adenopathy.   Psychiatric/Behavioral: Negative for behavioral problems and self-injury.           Current Outpatient Prescriptions:       montelukast (SINGULAIR) 10 MG tablet, Take 1 tablet (10 mg) by mouth At Bedtime, Disp: 90 tablet, Rfl: 3     budesonide-formoterol (SYMBICORT) 160-4.5 MCG/ACT Inhaler, Inhale 2 puffs into the lungs 2 times daily, Disp: 1 Inhaler, Rfl: 1     azelastine (OPTIVAR) 0.05 % SOLN ophthalmic solution, Apply 1 drop to eye 2 times daily as needed, Disp: 1 Bottle, Rfl: 0     levonorgestrel (MIRENA) 20 MCG/24HR IUD, 1 Device by Intrauterine route, Disp: , Rfl:      beclomethasone (QVAR) 80 MCG/ACT Inhaler, 1 puff into both nares twice daily with adaptor Needs to be seen for further refills, Disp: 1 Inhaler, Rfl: 1     cetirizine (ZYRTEC) 10 MG tablet, Take 1 tablet (10 mg) by mouth every evening, Disp: 30 tablet, Rfl: 1     albuterol (VENTOLIN HFA) 108 (90 BASE) MCG/ACT inhaler, Inhale 2 puffs into the lungs every 4 hours as needed 2 puffs every 4 hours for cough or wheeze.  2 puffs 15 minutes before exercise., Disp: 1 Inhaler, Rfl: 2     Abatacept (ORENCIA) 125 MG/ML SOLN, Inject 10 mLs Subcutaneous every 7 days, Disp: , Rfl:      [DISCONTINUED] montelukast (SINGULAIR) 10 MG tablet, Take 1 tablet (10 mg) by mouth At Bedtime (Patient not taking: Reported on 1/8/2018), Disp: 30 tablet, Rfl: 3     azelastine (ASTELIN) 0.1 % nasal spray, Spray 2 sprays into both nostrils 2 times daily as needed for rhinitis (Patient not taking: Reported on 9/18/2017), Disp: 1 Bottle, Rfl: 1  No current facility-administered medications for this visit.     Facility-Administered Medications Ordered in Other Visits:      lidocaine 1 % injection, , , PRN, Jonny Mayer, APRN CRNA, 5 mL at 04/19/15 1723  Immunization History   Administered Date(s) Administered     HPV 02/13/2009, 04/30/2009, 08/31/2009     HepB 07/27/1998, 09/30/1998, 02/01/1999     No Known Allergies  OBJECTIVE:                                                                 /72 (BP Location: Left arm, Patient Position: Sitting, Cuff Size: Adult Regular)  Pulse 71  Temp 98.2   F (36.8  C) (Oral)  Resp 16  Wt 70.9 kg (156 lb 4.9 oz)  SpO2 98%  BMI 26.04 kg/m2        Physical Exam   Constitutional: No distress.   HENT:   Head: Normocephalic and atraumatic.   Right Ear: Tympanic membrane, external ear and ear canal normal.   Left Ear: Tympanic membrane, external ear and ear canal normal.   Nose: No mucosal edema or rhinorrhea.   Mouth/Throat: Oropharynx is clear and moist and mucous membranes are normal.   Eyes: Conjunctivae are normal. Right eye exhibits no discharge. Left eye exhibits no discharge.   Neck: Normal range of motion.   Cardiovascular: Normal rate, regular rhythm and normal heart sounds.    No murmur heard.  Pulmonary/Chest: Effort normal and breath sounds normal. No respiratory distress. She has no wheezes. She has no rales.   Musculoskeletal: Normal range of motion.   Lymphadenopathy:     She has no cervical adenopathy.   Neurological: She is alert.   Skin: Skin is warm. No rash noted. She is not diaphoretic.   Psychiatric: Affect normal.   Nursing note and vitals reviewed.            WORKUP:   SPIROMETRY       FVC 3.68L (94% of predicted).     FEV1 2.67L (81% of predicted).     FEV1/FVC 72%     FEF 25%-75%  1.79L/s (51% of predicted)    The office spirometry performed today suggests small airway limitation.  There is a decreased FEV1/FVC ratio is well.      Asthma Control Test (ACT) total score: 20     ASSESSMENT/PLAN:    Problem List Items Addressed This Visit        Respiratory    1. Chronic allergic rhinitis due to animal hair and dander  Currently well controlled with Qvar 80 mcg 1 puff in each nostril once-twice daily and cetirizine 10 mg by mouth daily.  She does not consider allergen immunotherapy, being afraid of needles.  -Continue as is.  If symptoms get worse, she may restart azelastine more consistently.                  Other Visit Diagnoses     2. Not well controlled moderate persistent asthma    -  Primary  Talked with our pharmacy.  Symbicort with shereen  will be covered 100% for 1 year.  -Restart montelukast 10 mg by mouth once daily.  -Symbicort 160/4.5 mcg 2 puffs twice daily to be used with chamber device.  -Use albuterol 2-4 puffs every 4 hours as needed for persistent cough/chest tightness/wheezing/shortness of breath.  Asthma action plan was reviewed and provided.      Relevant Medications    montelukast (SINGULAIR) 10 MG tablet    budesonide-formoterol (SYMBICORT) 160-4.5 MCG/ACT Inhaler    Other Relevant Orders    Spirometry, Breathing Capacity (Completed)    3. Allergic conjunctivitis, bilateral  When not controlled with intranasal steroids and cetirizine, use Optivar 1 drop in each eye twice daily as needed.          Relevant Medications    azelastine (OPTIVAR) 0.05 % SOLN ophthalmic solution            Follow up in 6-8 weeks or sooner if needed.    Thank you for allowing us to participate in the care of this patient. Please feel free to contact us if there are any questions or concerns about the patient.    Disclaimer: This note consists of symbols derived from keyboarding, dictation and/or voice recognition software. As a result, there may be errors in the script that have gone undetected. Please consider this when interpreting information found in this chart.    Kofi Broussard MD, [facility]  Allergy, Asthma and Immunology  [place], [place] and [place]

## 2018-01-08 NOTE — PATIENT INSTRUCTIONS
Asthma management per asthma action plan.    Optivar 1 drop/eye twice daily as needed.   QVAR 1 puff in each nostril once-twice daily

## 2018-01-08 NOTE — MR AVS SNAPSHOT
"              After Visit Summary   1/8/2018    Suha Jean    MRN: [id number]           Patient Information     Date Of Birth          1985        Visit Information        Provider Department      1/8/2018 2:40 PM Kofi Broussard MD [facility]        Today's Diagnoses     Not well controlled moderate persistent asthma    -  1    Chronic allergic rhinitis due to animal hair and dander        Allergic conjunctivitis, bilateral          Care Instructions    Asthma management per asthma action plan.    Optivar 1 drop/eye twice daily as needed.   QVAR 1 puff in each nostril once-twice daily          Follow-ups after your visit        Follow-up notes from your care team     Return in about 6 weeks (around 2/19/2018), or if symptoms worsen or fail to improve, for rhinitis follow up, asthma follow up.      Who to contact     If you have questions or need follow up information about today's clinic visit or your schedule please contact [facility] directly at [phone].  Normal or non-critical lab and imaging results will be communicated to you by Pro [employer] Games[employer], letter or phone within 4 business days after the clinic has received the results. If you do not hear from us within 7 days, please contact the clinic through Liquiverset or phone. If you have a critical or abnormal lab result, we will notify you by phone as soon as possible.  Submit refill requests through Lever or call your pharmacy and they will forward the refill request to us. Please allow 3 business days for your refill to be completed.          Additional Information About Your Visit        MyChart Information     Lever lets you send messages to your doctor, view your test results, renew your prescriptions, schedule appointments and more. To sign up, go to [email or web address] . Click on \"Log in\" on the left side of the screen, which will take you to the Welcome page. Then click on \"Sign up Now\" on the right side of the " page.     You will be asked to enter the access code listed below, as well as some personal information. Please follow the directions to create your username and password.     Your access code is: [email or web address]  Expires: 2018  3:16 PM     Your access code will  in 90 days. If you need help or a new code, please call your [facility] clinic or [phone].        Care EveryWhere ID     This is your Care EveryWhere ID. This could be used by other organizations to access your [facility] medical records  [id number]        Your Vitals Were     Pulse Temperature Respirations Pulse Oximetry BMI (Body Mass Index)       71 98.2  F (36.8  C) (Oral) 16 98% 26.04 kg/m2        Blood Pressure from Last 3 Encounters:   18 110/72   17 121/72   17 108/72    Weight from Last 3 Encounters:   18 70.9 kg (156 lb 4.9 oz)   17 66.9 kg (147 lb 7.8 oz)   17 66.5 kg (146 lb 9.6 oz)              We Performed the Following     Spirometry, Breathing Capacity          Today's Medication Changes          These changes are accurate as of: 18  3:16 PM.  If you have any questions, ask your nurse or doctor.               Start taking these medicines.        Dose/Directions    azelastine 0.05 % Soln ophthalmic solution   Commonly known as:  OPTIVAR   Used for:  Allergic conjunctivitis, bilateral   Started by:  Kofi Broussard MD        Dose:  1 drop   Apply 1 drop to eye 2 times daily as needed   Quantity:  1 Bottle   Refills:  0       budesonide-formoterol 160-4.5 MCG/ACT Inhaler   Commonly known as:  SYMBICORT   Used for:  Not well controlled moderate persistent asthma   Started by:  Kofi Broussard MD        Dose:  2 puff   Inhale 2 puffs into the lungs 2 times daily   Quantity:  1 Inhaler   Refills:  1         Stop taking these medicines if you haven't already. Please contact your care team if you have questions.     Fluticasone-Salmeterol 232-14 MCG/ACT Aepb   Stopped by:  Kofi Broussard MD                 Where to get your medicines      These medications were sent to [facility] [place], [place] - [address]  [address] [address]     Phone:  [phone]     azelastine 0.05 % Soln ophthalmic solution    montelukast 10 MG tablet         Call your pharmacy to confirm that your medication is ready for pickup. It may take up to 24 hours for them to receive the prescription. If the prescription is not ready within 3 business days, please contact your clinic or your provider.     We will let you know when these medications are ready. If you don't hear back within 3 business days, please contact us.     budesonide-formoterol 160-4.5 MCG/ACT Inhaler                Primary Care Provider Office Phone # Fax #    [facility] [phone] [address] [place] [address]        Equal Access to Services     BRIGIDA RIZVI : Maurice Sidhu, wachauncey vick, qabasil kaalmaaiden harrison, glen tai . So [facility] [phone].    ATENCIÓN: Si habla español, tiene a roque disposición servicios gratuitos de asistencia lingüística. Llame al [phone].    We comply with applicable federal civil rights laws and [place] laws. We do not discriminate on the basis of race, color, national origin, age, disability, sex, sexual orientation, or gender identity.            Thank you!     Thank you for choosing [facility]  for your care. Our goal is always to provide you with excellent care. Hearing back from our patients is one way we can continue to improve our services. Please take a few minutes to complete the written survey that you may receive in the mail after your visit with us. Thank you!             Your Updated Medication List - Protect others around you: Learn how to safely use, store and throw away your medicines at www.disposemymeds.org.          This list is accurate as of: 1/8/18  3:16 PM.  Always  use your most recent med list.                   Brand Name Dispense Instructions for use Diagnosis    albuterol 108 (90 BASE) MCG/ACT Inhaler    VENTOLIN HFA    1 Inhaler    Inhale 2 puffs into the lungs every 4 hours as needed 2 puffs every 4 hours for cough or wheeze.  2 puffs 15 minutes before exercise.    Severe persistent asthma       azelastine 0.05 % Soln ophthalmic solution    OPTIVAR    1 Bottle    Apply 1 drop to eye 2 times daily as needed    Allergic conjunctivitis, bilateral       azelastine 0.1 % spray    ASTELIN    1 Bottle    Spray 2 sprays into both nostrils 2 times daily as needed for rhinitis    Moderate persistent asthma with acute exacerbation       beclomethasone 80 MCG/ACT Inhaler    QVAR    1 Inhaler    1 puff into both nares twice daily with adaptor Needs to be seen for further refills    Moderate persistent asthma without complication       budesonide-formoterol 160-4.5 MCG/ACT Inhaler    SYMBICORT    1 Inhaler    Inhale 2 puffs into the lungs 2 times daily    Not well controlled moderate persistent asthma       cetirizine 10 MG tablet    zyrTEC    30 tablet    Take 1 tablet (10 mg) by mouth every evening    Acute sinusitis with symptoms > 10 days       levonorgestrel 20 MCG/24HR IUD    MIRENA     1 Device by Intrauterine route    Not well controlled moderate persistent asthma       montelukast 10 MG tablet    SINGULAIR    90 tablet    Take 1 tablet (10 mg) by mouth At Bedtime    Not well controlled moderate persistent asthma       ORENCIA 125 MG/ML Sosy pre-filled syringe   Generic drug:  Abatacept      Inject 10 mLs Subcutaneous every 7 days

## 2018-01-08 NOTE — LETTER
My Asthma Action Plan  Name: Suha Jean   YOB: 1985  Date: 1/8/2018   My doctor: Kofi Broussard MD   My clinic: [facility]        My Control Medicine: Budesonide + formoterol (Symbicort) -  160/4.5 mcg 2 puffs twice daily  Montelukast (Singulair) -  10 mg by mouth once daily at bedtime  My Rescue Medicine: Albuterol (Proair/Ventolin/Proventil) inhaler 2-4 puffs every 4 hrs as needed   My Asthma Severity: moderate persistent  Avoid your asthma triggers:     smoke, upper respiratory infections, animal dander and exercise or sports               GREEN ZONE   Good Control    I feel good    No cough or wheeze    Can work, sleep and play without asthma symptoms       Take your asthma control medicine every day.     1. If exercise triggers your asthma, take your rescue medication    15 minutes before exercise or sports, and    During exercise if you have asthma symptoms  2. Spacer to use with inhaler: If you have a spacer, make sure to use it with your inhaler             YELLOW ZONE Getting Worse  I have ANY of these:    I do not feel good    Cough or wheeze    Chest feels tight    Wake up at night   1. Keep taking your Green Zone medications  2. Start taking your rescue medicine:    every 20 minutes for up to 1 hour. Then every 4 hours for 24-48 hours.  3. If you stay in the Yellow Zone for more than 24-48 hrs hours, contact your doctor.             RED ZONE Medical Alert - Get Help  I have ANY of these:    I feel awful    Medicine is not helping    Breathing getting harder    Trouble walking or talking    Nose opens wide to breathe       1. Take your rescue medicine NOW  2. If your provider has prescribed an oral steroid medicine, start taking it NOW  3. Call your doctor NOW  4. If you are still in the Red Zone after 20 minutes and you have not reached your doctor:    Take your rescue medicine again and    Call 911 or go to the emergency room right away    See your regular doctor  within 2 weeks of an Emergency Room or Urgent Care visit for follow-up treatment.        Electronically signed by: Kofi Broussard, January 8, 2018    Annual Reminders:  Meet with Asthma Educator,  Flu Shot in the Fall, consider Pneumonia Vaccination for patients with asthma (aged 19 and older).    Pharmacy:    [id number] - [place], [place] - [address]  [facility] - [address]                    Asthma Triggers  How To Control Things That Make Your Asthma Worse    Triggers are things that make your asthma worse.  Look at the list below to help you find your triggers and what you can do about them.  You can help prevent asthma flare-ups by staying away from your triggers.      Trigger                                                          What you can do   Cigarette Smoke  Tobacco smoke can make asthma worse. Do not allow smoking in your home, car or around you.  Be sure no one smokes at a child s day care or school.  If you smoke, ask your health care provider for ways to help you quit.  Ask family members to quit too.  Ask your health care provider for a referral to Quit Plan to help you quit smoking, or call 8-247-848-PLAN.     Colds, Flu, Bronchitis  These are common triggers of asthma. Wash your hands often.  Don t touch your eyes, nose or mouth.  Get a flu shot every year.     Dust Mites  These are tiny bugs that live in cloth or carpet. They are too small to see. Wash sheets and blankets in hot water every week.   Encase pillows and mattress in dust mite proof covers.  Avoid having carpet if you can. If you have carpet, vacuum weekly.   Use a dust mask and HEPA vacuum.   Pollen and Outdoor Mold  Some people are allergic to trees, grass, or weed pollen, or molds. Try to keep your windows closed.  Limit time out doors when pollen count is high.   Ask you health care provider about taking medicine during allergy season.     Animal Dander  Some people  are allergic to skin flakes, urine or saliva from pets with fur or feathers. Keep pets with fur or feathers out of your home.    If you can t keep the pet outdoors, then keep the pet out of your bedroom.  Keep the bedroom door closed.  Keep pets off cloth furniture and away from stuffed toys.     Mice, Rats, and Cockroaches  Some people are allergic to the waste from these pests.   Cover food and garbage.  Clean up spills and food crumbs.  Store grease in the refrigerator.   Keep food out of the bedroom.   Indoor Mold  This can be a trigger if your home has high moisture. Fix leaking faucets, pipes, or other sources of water.   Clean moldy surfaces.  Dehumidify basement if it is damp and smelly.   Smoke, Strong Odors, and Sprays  These can reduce air quality. Stay away from strong odors and sprays, such as perfume, powder, hair spray, paints, smoke incense, paint, cleaning products, candles and new carpet.   Exercise or Sports  Some people with asthma have this trigger. Be active!  Ask your doctor about taking medicine before sports or exercise to prevent symptoms.    Warm up for 5-10 minutes before and after sports or exercise.     Other Triggers of Asthma  Cold air:  Cover your nose and mouth with a scarf.  Sometimes laughing or crying can be a trigger.  Some medicines and food can trigger asthma.

## 2018-01-08 NOTE — NURSING NOTE
"Chief Complaint   Patient presents with     Asthma Recheck       Initial /72 (BP Location: Left arm, Patient Position: Sitting, Cuff Size: Adult Regular)  Pulse 71  Temp 98.2  F (36.8  C) (Oral)  Resp 16  Wt 70.9 kg (156 lb 4.9 oz)  SpO2 98%  BMI 26.04 kg/m2 Estimated body mass index is 26.04 kg/(m^2) as calculated from the following:    Height as of 9/18/17: 1.65 m (5' 4.96\").    Weight as of this encounter: 70.9 kg (156 lb 4.9 oz).  Medication Reconciliation: complete    "

## 2018-01-09 ASSESSMENT — ASTHMA QUESTIONNAIRES: ACT_TOTALSCORE: 20

## 2018-01-21 ENCOUNTER — APPOINTMENT (OUTPATIENT)
Dept: GENERAL RADIOLOGY | Facility: CLINIC | Age: 33
End: 2018-01-21
Attending: PHYSICIAN ASSISTANT
Payer: COMMERCIAL

## 2018-01-21 ENCOUNTER — HOSPITAL ENCOUNTER (EMERGENCY)
Facility: CLINIC | Age: 33
Discharge: HOME | End: 2018-01-21
Attending: PHYSICIAN ASSISTANT | Admitting: PHYSICIAN ASSISTANT
Payer: COMMERCIAL

## 2018-01-21 VITALS
OXYGEN SATURATION: 98 % | HEART RATE: 86 BPM | DIASTOLIC BLOOD PRESSURE: 80 MMHG | TEMPERATURE: 97.9 F | SYSTOLIC BLOOD PRESSURE: 122 MMHG

## 2018-01-21 DIAGNOSIS — M54.6 ACUTE LEFT-SIDED THORACIC BACK PAIN: ICD-10-CM

## 2018-01-21 PROCEDURE — 71046 X-RAY EXAM CHEST 2 VIEWS: CPT

## 2018-01-21 PROCEDURE — 99213 OFFICE O/P EST LOW 20 MIN: CPT | Performed by: PHYSICIAN ASSISTANT

## 2018-01-21 PROCEDURE — 25000132 ZZH RX MED GY IP 250 OP 250 PS 637: Performed by: PHYSICIAN ASSISTANT

## 2018-01-21 PROCEDURE — G0463 HOSPITAL OUTPT CLINIC VISIT: HCPCS | Mod: 25

## 2018-01-21 RX ORDER — BENZONATATE 100 MG/1
100 CAPSULE ORAL 3 TIMES DAILY PRN
Qty: 42 CAPSULE | Refills: 0 | Status: SHIPPED | OUTPATIENT
Start: 2018-01-21 | End: 2018-08-14

## 2018-01-21 RX ORDER — IBUPROFEN 200 MG
600 TABLET ORAL ONCE
Status: COMPLETED | OUTPATIENT
Start: 2018-01-21 | End: 2018-01-21

## 2018-01-21 RX ORDER — HYDROCODONE BITARTRATE AND ACETAMINOPHEN 5; 325 MG/1; MG/1
1-2 TABLET ORAL EVERY 4 HOURS PRN
Qty: 15 TABLET | Refills: 0 | Status: SHIPPED | OUTPATIENT
Start: 2018-01-21

## 2018-01-21 RX ADMIN — IBUPROFEN 600 MG: 200 TABLET, FILM COATED ORAL at 18:50

## 2018-01-21 NOTE — ED AVS SNAPSHOT
[facility] Emergency Department    [address] [place] [address]    Phone:  [phone]    Fax:  [phone]                                       Suha Jean   MRN: [id number]    Department:  [facility] Emergency Department   Date of Visit:  1/21/2018           Patient Information     Date Of Birth          1985        Your diagnoses for this visit were:     Acute left-sided thoracic back pain        You were seen by Ifeoma Saenz PA-C.      Follow-up Information     Follow up with Clinic, [facility] In 1 week.    Why:  As needed    Contact information:    [address] [place] [address] [id number] [phone]          Discharge Instructions         General Neck and Back Pain    Both neck and back pain are usually caused by injury to the muscles or ligaments of the spine. Sometimes the disks that separate each bone of the spine may cause pain by pressing on a nearby nerve. Back and neck pain may appear after a sudden twisting or bending force (such as in a car accident), or sometimes after a simple awkward movement. In either case, muscle spasm is often present and adds to the pain.  Acute neck and back pain usually gets better in 1 to 2 weeks. Pain related to disk disease, arthritis in the spinal joints or spinal stenosis (narrowing of the spinal canal) can become chronic and last for months or years.  Back and neck pain are common problems. Most people feel better in 1 or 2 weeks, and most of the rest in 1 to 2 months. Most people can remain active.  People experience and describe pain differently.    Pain can be sharp, stabbing, shooting, aching, cramping, or burning    Movement, standing, bending, lifting, sitting, or walking may worsen the pain    Pain can be localized to one spot or area, or it can be more generalized    Pain can spread or radiate upwards, downwards, to the front, or go down your arms    Muscle spasm may occur.  Most of the time mechanical  problems with the muscles or spine cause the pain. it is usually caused by an injury, whether known or not, to the muscles or ligaments. While illnesses can cause back pain, it is usually not caused by a serious illness. Pain is usually related to physical activity, whether sports, exercise, work, or normal activity. Sometimes it can occur without an identifiable cause. This can happen simply by stretching or moving wrong, without noting pain at the time. Other causes include:    Overexertion, lifting, pushing, pulling incorrectly or too aggressively.    Sudden twisting, bending or stretching from an accident (car or fall), or accidental movement.    Poor posture    Poor conditioning, lack of regular exercise    Spinal disc disease or arthritis    Stress    Pregnancy, or illness like appendicitis, bladder or kidney infection, pelvic infections   Home care    For neck pain: Use a comfortable pillow that supports the head and keeps the spine in a neutral position. The position of the head should not be tilted forward or backward.    When in bed, try to find a position of comfort. A firm mattress is best. Try lying flat on your back with pillows under your knees. You can also try lying on your side with your knees bent up towards your chest and a pillow between your knees.    At first, do not try to stretch out the sore spots. If there is a strain, it is not like the good soreness you get after exercising without an injury. In this case, stretching may make it worse.    Avoid prolonged sitting, long car rides or travel. This puts more stress on the lower back than standing or walking.    During the first 24 to 72 hours after an injury, apply an ice pack to the painful area for 20 minutes and then remove it for 20 minutes over a period of 60 to 90 minutes or several times a day.     You can alternate ice and heat therapies. Talk with your healthcare provider about the best treatment for your back or neck pain. As a  safety precaution, do not use a heating pad at bedtime. Sleeping with a heating pad can lead to skin burns or tissue damage.    Therapeutic massage can help relax the back and neck muscles without stretching them.    Be aware of safe lifting methods and do not lift anything over 15 pounds until all the pain is gone.  Medications  Talk to your healthcare provider before using medicine, especially if you have other medical problems or are taking other medicines.    You may use over-the-counter medicine to control pain, unless another pain medicine was prescribed. If you have chronic conditions like diabetes, liver or kidney disease, stomach ulcers,  gastrointestinal bleeding, or are taking blood thinner medicines.    Be careful if you are given pain medicines, narcotics, or medicine for muscle spasm. They can cause drowsiness, and can affect your coordination, reflexes, and judgment. Do not drive or operate heavy machinery.  Follow-up care  Follow up with your healthcare provider, or as advised. Physical therapy or further tests may be needed.  If X-rays were taken, you will be notified of any new findings that may affect your care.  Call 911  Seek emergency medical care if any of the following occur:    Trouble breathing    Confusion    Very drowsy or trouble awakening    Fainting or loss of consciousness    Rapid or very slow heart rate    Loss of bowel or bladder control  When to seek medical advice  Call your healthcare provider right away if any of these occur:    Pain becomes worse or spreads into your arms or legs    Weakness, numbness or pain in one or both arms or legs    Numbness in the groin area    Difficulty walking    Fever of 100.4 F (38 C) or higher, or as directed by your healthcare provider  Date Last Reviewed: 7/1/2016 2000-2017 [employer]. [address] [place] [address], [place] [address]. All rights reserved. This information is not intended as a substitute for professional medical care.  Always follow your healthcare professional's instructions.          Future Appointments        Provider Department Dept Phone Center    3/5/2018 2:40 PM Kofi Broussard MD [facility] [phone] [facility]      24 Hour Appointment Hotline       To make an appointment at any [facility], call [phone] ([phone]). If you don't have a family doctor or clinic, we will help you find one. [facility] are conveniently located to serve the needs of you and your family.             Review of your medicines      START taking        Dose / Directions Last dose taken    benzonatate 100 MG capsule   Commonly known as:  TESSALON   Dose:  100 mg   Quantity:  42 capsule        Take 1 capsule (100 mg) by mouth 3 times daily as needed   Refills:  0        HYDROcodone-acetaminophen 5-325 MG per tablet   Commonly known as:  NORCO   Dose:  1-2 tablet   Quantity:  15 tablet        Take 1-2 tablets by mouth every 4 hours as needed for moderate to severe pain   Refills:  0          Our records show that you are taking the medicines listed below. If these are incorrect, please call your family doctor or clinic.        Dose / Directions Last dose taken    albuterol 108 (90 BASE) MCG/ACT Inhaler   Commonly known as:  VENTOLIN HFA   Dose:  2 puff   Quantity:  1 Inhaler        Inhale 2 puffs into the lungs every 4 hours as needed 2 puffs every 4 hours for cough or wheeze.  2 puffs 15 minutes before exercise.   Refills:  2        azelastine 0.05 % Soln ophthalmic solution   Commonly known as:  OPTIVAR   Dose:  1 drop   Quantity:  1 Bottle        Apply 1 drop to eye 2 times daily as needed   Refills:  0        azelastine 0.1 % spray   Commonly known as:  ASTELIN   Dose:  2 spray   Quantity:  1 Bottle        Spray 2 sprays into both nostrils 2 times daily as needed for rhinitis   Refills:  1        beclomethasone 80 MCG/ACT Inhaler   Commonly known as:  QVAR   Quantity:  1 Inhaler        1 puff into both nares twice  daily with adaptor Needs to be seen for further refills   Refills:  1        budesonide-formoterol 160-4.5 MCG/ACT Inhaler   Commonly known as:  SYMBICORT   Dose:  2 puff   Quantity:  1 Inhaler        Inhale 2 puffs into the lungs 2 times daily   Refills:  1        cetirizine 10 MG tablet   Commonly known as:  zyrTEC   Dose:  10 mg   Quantity:  30 tablet        Take 1 tablet (10 mg) by mouth every evening   Refills:  1        levonorgestrel 20 MCG/24HR IUD   Commonly known as:  MIRENA   Dose:  1 Device        1 Device by Intrauterine route   Refills:  0        montelukast 10 MG tablet   Commonly known as:  SINGULAIR   Dose:  10 mg   Quantity:  90 tablet        Take 1 tablet (10 mg) by mouth At Bedtime   Refills:  3        ORENCIA 125 MG/ML Sosy pre-filled syringe   Dose:  10 mL   Indication:  Moderate to Severe Rheumatoid Arthritis   Generic drug:  Abatacept        Inject 10 mLs Subcutaneous every 7 days   Refills:  0                Prescriptions were sent or printed at these locations (2 Prescriptions)                   [facility] [place] [address] [facility]   [address] [facility] [address]    Telephone:  [phone]   Fax:  [phone]   Hours:                  E-Prescribed (1 of 2)         benzonatate (TESSALON) 100 MG capsule                 Printed at Department/Unit printer (1 of 2)         HYDROcodone-acetaminophen (NORCO) 5-325 MG per tablet                Procedures and tests performed during your visit     Chest XR,  PA & LAT      Orders Needing Specimen Collection     None      Pending Results     No orders found from 1/19/2018 to 1/22/2018.            Pending Culture Results     No orders found from 1/19/2018 to 1/22/2018.            Pending Results Instructions     If you had any lab results that were not finalized at the time of your Discharge, you can call the ED Lab Result RN at [phone]. You will be contacted by this team for any positive Lab results or changes in  "treatment. The nurses are available 7 days a week from 10A to 6:30P.  You can leave a message 24 hours per day and they will return your call.        Test Results From Your Hospital Stay        2018  7:20 PM      Narrative     CHEST TWO VIEWS  2018 7:06 PM     HISTORY: Cough, shortness of breath, thoracic back pain after getting  out of vehicle today.    COMPARISON: Chest x-ray 2016.        Impression     IMPRESSION: PA and lateral views of the chest. Lungs are clear. Heart  is normal in size. No effusions are evident. No pneumothorax.    JEMAL MONAHAN MD                Thank you for choosing [facility]       Thank you for choosing [facility] for your care. Our goal is always to provide you with excellent care. Hearing back from our patients is one way we can continue to improve our services. Please take a few minutes to complete the written survey that you may receive in the mail after you visit with us. Thank you!        [employer] Information     [employer] lets you send messages to your doctor, view your test results, renew your prescriptions, schedule appointments and more. To sign up, go to [email or web address] . Click on \"Log in\" on the left side of the screen, which will take you to the Welcome page. Then click on \"Sign up Now\" on the right side of the page.     You will be asked to enter the access code listed below, as well as some personal information. Please follow the directions to create your username and password.     Your access code is: [email or web address]  Expires: 2018  3:16 PM     Your access code will  in 90 days. If you need help or a new code, please call your [facility] clinic or [phone].        Care EveryWhere ID     This is your Care EveryWhere ID. This could be used by other organizations to access your [facility] medical records  [id number]        Equal Access to Services     BRIGIDA RIZVI AH: Maurice Sidhu, sara vick, glen ruano " isaiah tai ah. So [facility] [phone].    ATENCIÓN: Si habla español, tiene a roque disposición servicios gratuitos de asistencia lingüística. Llame al [phone].    We comply with applicable federal civil rights laws and [place] laws. We do not discriminate on the basis of race, color, national origin, age, disability, sex, sexual orientation, or gender identity.            After Visit Summary       This is your record. Keep this with you and show to your community pharmacist(s) and doctor(s) at your next visit.

## 2018-01-21 NOTE — ED AVS SNAPSHOT
[facility] Emergency Department    [address] [facility] [address]    Phone:  [phone]    Fax:  [phone]                                       Suha Jean   MRN: [id number]    Department:  [facility] Emergency Department   Date of Visit:  1/21/2018           After Visit Summary Signature Page     I have received my discharge instructions, and my questions have been answered. I have discussed any challenges I see with this plan with the nurse or doctor.    ..........................................................................................................................................  Patient/Patient Representative Signature      ..........................................................................................................................................  Patient Representative Print Name and Relationship to Patient    ..................................................               ................................................  Date                                            Time    ..........................................................................................................................................  Reviewed by Signature/Title    ...................................................              ..............................................  Date                                                            Time

## 2018-01-22 NOTE — ED PROVIDER NOTES
History     Chief Complaint   Patient presents with     Rib Pain     klt [facility] erib pain after cough     HPI  Suha Jean is a 32 year old female who presents to the  with concern over left sided chest wall pain for the last twelve hours.  She states that she has had ongoing cough for the last several weeks.  She has had some shortness of breath associated with it she denies any objective fever, generalized chills, myalgias, nasal congestion, sore throats, wheezing or abdominal complaints.  She does have a history of asthma however she has not attempted to increase her albuterol use.  She has been taking Tylenol for pain without improvement.  She states that back pain began after she was attempting to get out of the vehicle.  She did not have any other significant trauma.  She has not had any history of similar back pains previously.    Problem List:    Patient Active Problem List    Diagnosis Date Noted     Headache 04/22/2015     Priority: Medium     Migraine without aura 04/22/2015     Priority: Medium     Problem list name updated by automated process. Provider to review       Rheumatoid arthritis (H) 03/27/2015     Priority: Medium     Followed by [facility]      Problem list name updated by automated process. Provider to review       Moderate persistent asthma 03/25/2014     Priority: Medium     Chronic allergic rhinitis due to animal hair and dander 03/25/2014     Priority: Medium     In November 2016, elevated total serum IgE noted (213KIU/L), and significantly positive IgE for cat, dog and horse.        Past Medical History:    Past Medical History:   Diagnosis Date     RA (rheumatoid arthritis) (H)        Past Surgical History:    Past Surgical History:   Procedure Laterality Date     APPENDECTOMY       Family History:    Family History   Problem Relation Age of Onset     Bronchitis Mother      Lupus Mother      Alcoholism Father      Social History:  Marital Status:   [2]  Social  History   Substance Use Topics     Smoking status: Never Smoker     Smokeless tobacco: Never Used     Alcohol use 0.0 oz/week     0 Standard drinks or equivalent per week      Comment: socially        Medications:      montelukast (SINGULAIR) 10 MG tablet   budesonide-formoterol (SYMBICORT) 160-4.5 MCG/ACT Inhaler   azelastine (OPTIVAR) 0.05 % SOLN ophthalmic solution   levonorgestrel (MIRENA) 20 MCG/24HR IUD   azelastine (ASTELIN) 0.1 % nasal spray   beclomethasone (QVAR) 80 MCG/ACT Inhaler   cetirizine (ZYRTEC) 10 MG tablet   albuterol (VENTOLIN HFA) 108 (90 BASE) MCG/ACT inhaler   Abatacept (ORENCIA) 125 MG/ML SOLN     Review of Systems  CONSTITUTIONAL:NEGATIVE for fever, chills, change in weight  INTEGUMENTARY/SKIN: NEGATIVE for worrisome rashes, moles or lesions  EYES: NEGATIVE for vision changes or irritation  ENT/MOUTH: NEGATIVE for ear, mouth and throat problems  RESP:POSITIVE for cough, shortness of breath and NEGATIVE for wheezing  GI: NEGATIVE for abdominal pain, diarrhea, nausea and vomiting  MUSCULOSKELETAL: POSITIVE  for left-sided rib/back pain and NEGATIVE for other acute arthralgias or myalgias  NEURO: NEGATIVE for weakness, dizziness or paresthesias  Physical Exam   BP: 122/80  Pulse: 86  Temp: 97.9  F (36.6  C)  SpO2: 98 %  Physical Exam   Constitutional: She is oriented to person, place, and time. She appears well-developed and well-nourished. No distress.   HENT:   Head: Normocephalic and atraumatic.   Mouth/Throat: Oropharynx is clear and moist.   Eyes: Conjunctivae and EOM are normal. Pupils are equal, round, and reactive to light. Right eye exhibits no discharge. Left eye exhibits no discharge.   Neck: Normal range of motion. Neck supple.   Cardiovascular: Normal rate, regular rhythm and normal heart sounds.  Exam reveals no gallop and no friction rub.    No murmur heard.  Pulmonary/Chest: Effort normal and breath sounds normal. No respiratory distress. She has no wheezes. She has no rales.    Tenderness to palpation of the lateral and inferior left chest wall   Abdominal: There is no CVA tenderness.   Lymphadenopathy:     She has no cervical adenopathy.   Neurological: She is alert and oriented to person, place, and time.   Skin: Skin is warm and dry. No rash noted. No erythema.       ED Course     ED Course     Procedures        Critical Care time:  none            Labs Ordered and Resulted from Time of ED Arrival Up to the Time of Departure from the ED - No data to display    Results for orders placed or performed during the hospital encounter of 01/21/18   Chest XR,  PA & LAT    Narrative    CHEST TWO VIEWS  1/21/2018 7:06 PM     HISTORY: Cough, shortness of breath, thoracic back pain after getting  out of vehicle today.    COMPARISON: Chest x-ray 11/11/2016.      Impression    IMPRESSION: PA and lateral views of the chest. Lungs are clear. Heart  is normal in size. No effusions are evident. No pneumothorax.    JEMAL MONAHAN MD     Assessments & Plan (with Medical Decision Making)     I have reviewed the nursing notes.    I have reviewed the findings, diagnosis, plan and need for follow up with the patient.       Discharge Medication List as of 1/21/2018  7:21 PM      START taking these medications    Details   benzonatate (TESSALON) 100 MG capsule Take 1 capsule (100 mg) by mouth 3 times daily as needed, Disp-42 capsule, R-0, E-Prescribe      HYDROcodone-acetaminophen (NORCO) 5-325 MG per tablet Take 1-2 tablets by mouth every 4 hours as needed for moderate to severe pain, Disp-15 tablet, R-0, Local Print           Final diagnoses:   Acute left-sided thoracic back pain     32-year-old female who has had ongoing cough for the last several weeks presents to the urgent care with concern over left-sided rib/chest pain which occurred after she attempted to get out of the vehicle.  She had stable vital signs upon arrival.  Physical exam findings as described above.  As part of evaluation patient did have  x-ray of her chest which did not demonstrate any acute rib abnormality.  There is no evidence of pneumonia, pneumothorax, pleural effusion or significant change in cardiopulmonary vasculature.  Differential for her pain would include chest wall strain, costochondritis, pleurisy.  I do not suspect PE, pyelonephritis or other intra- abdominal proces patient was discharged home stays contributing to her pain.  patient was discharged home stable with prescription for Tessalon to control cough she was instructed to use OTC Tylenol/ibuprofen as needed for pain.  Prescription for hydrocodone to use as needed for sleeping-breakthrough pain given.  Follow-up with primary care provider if no improvement of symptoms within the next 5-7 days.  Worrisome reasons to return to the ER/UC sooner discussed.    Disclaimer: This note consists of symbols derived from keyboarding, dictation, and/or voice recognition software. As a result, there may be errors in the script that have gone undetected.  Please consider this when interpreting information found in the chart.      1/21/2018   [facility] EMERGENCY DEPARTMENT     Ifeoma Saenz PA-C  01/25/18 2012

## 2018-01-22 NOTE — DISCHARGE INSTRUCTIONS

## 2018-04-23 DIAGNOSIS — J45.40 NOT WELL CONTROLLED MODERATE PERSISTENT ASTHMA: ICD-10-CM

## 2018-04-23 RX ORDER — BUDESONIDE AND FORMOTEROL FUMARATE DIHYDRATE 160; 4.5 UG/1; UG/1
2 AEROSOL RESPIRATORY (INHALATION) 2 TIMES DAILY
Qty: 1 INHALER | Refills: 1 | Status: SHIPPED | OUTPATIENT
Start: 2018-04-23

## 2018-08-11 ENCOUNTER — OFFICE VISIT (OUTPATIENT)
Dept: URGENT CARE | Facility: URGENT CARE | Age: 33
End: 2018-08-11
Payer: COMMERCIAL

## 2018-08-11 VITALS
WEIGHT: 151.6 LBS | RESPIRATION RATE: 16 BRPM | TEMPERATURE: 98.4 F | BODY MASS INDEX: 25.26 KG/M2 | SYSTOLIC BLOOD PRESSURE: 117 MMHG | DIASTOLIC BLOOD PRESSURE: 67 MMHG | HEART RATE: 68 BPM | OXYGEN SATURATION: 99 %

## 2018-08-11 DIAGNOSIS — R09.82 PND (POST-NASAL DRIP): Primary | ICD-10-CM

## 2018-08-11 DIAGNOSIS — J01.00 ACUTE MAXILLARY SINUSITIS, RECURRENCE NOT SPECIFIED: ICD-10-CM

## 2018-08-11 PROCEDURE — 99213 OFFICE O/P EST LOW 20 MIN: CPT | Performed by: PHYSICIAN ASSISTANT

## 2018-08-11 RX ORDER — FLUTICASONE PROPIONATE 50 MCG
2 SPRAY, SUSPENSION (ML) NASAL DAILY
Qty: 1 BOTTLE | Refills: 11 | Status: SHIPPED | OUTPATIENT
Start: 2018-08-11 | End: 2018-08-14

## 2018-08-11 ASSESSMENT — ENCOUNTER SYMPTOMS
MUSCULOSKELETAL NEGATIVE: 1
ARTHRALGIAS: 0
ALLERGIC/IMMUNOLOGIC NEGATIVE: 1
LIGHT-HEADEDNESS: 0
JOINT SWELLING: 0
MYALGIAS: 0
SHORTNESS OF BREATH: 0
BRUISES/BLEEDS EASILY: 0
SORE THROAT: 0
ENDOCRINE NEGATIVE: 1
VOMITING: 0
CARDIOVASCULAR NEGATIVE: 1
HEADACHES: 0
PALPITATIONS: 0
NECK STIFFNESS: 0
WEAKNESS: 0
RESPIRATORY NEGATIVE: 1
NECK PAIN: 0
NAUSEA: 0
FEVER: 0
HEMATOLOGIC/LYMPHATIC NEGATIVE: 1
RHINORRHEA: 0
CHILLS: 0
DIZZINESS: 0
DIARRHEA: 0
BACK PAIN: 0
EYES NEGATIVE: 1
COUGH: 0
WOUND: 0

## 2018-08-11 NOTE — PROGRESS NOTES
Chief Complaint:    Chief Complaint   Patient presents with     Sinus Problem       HPI: Suha Jean is an 33 year old female who presents for evaluation and treatment of sinus congestion.      ROS:      Review of Systems   Constitutional: Negative for chills and fever.   HENT: Negative for congestion, ear pain, rhinorrhea and sore throat.    Eyes: Negative.    Respiratory: Negative.  Negative for cough and shortness of breath.    Cardiovascular: Negative.  Negative for chest pain and palpitations.   Gastrointestinal: Negative for diarrhea, nausea and vomiting.   Endocrine: Negative.    Genitourinary: Negative.    Musculoskeletal: Negative.  Negative for arthralgias, back pain, joint swelling, myalgias, neck pain and neck stiffness.   Skin: Negative.  Negative for rash and wound.   Allergic/Immunologic: Negative.  Negative for immunocompromised state.   Neurological: Negative for dizziness, weakness, light-headedness and headaches.   Hematological: Negative.  Does not bruise/bleed easily.        Family History   Family History   Problem Relation Age of Onset     Bronchitis Mother      Lupus Mother      Alcoholism Father        Social History  Social History     Social History     Marital status:      Spouse name: N/A     Number of children: N/A     Years of education: N/A     Occupational History     Not on file.     Social History Main Topics     Smoking status: Never Smoker     Smokeless tobacco: Never Used     Alcohol use 0.0 oz/week     0 Standard drinks or equivalent per week      Comment: socially     Drug use: No     Sexual activity: Not on file     Other Topics Concern     Not on file     Social History Narrative    1/8/2018        ENVIRONMENTAL HISTORY: [place] The home is heated with a forced air and gas furnace. It does have central air conditioning. The patient's bedroom is furnished with hard jennie in bedroom, allergen mattress cover and  allergen pillowcase cover.  Pets inside the house include 1 cat and 1 dog. There is no history of cockroach or mice infestation. There are no smokers in the house.  The house does not have a damp basement. No bunny since end of 2017.            Surgical History:  Past Surgical History:   Procedure Laterality Date     APPENDECTOMY          Problem List:  Patient Active Problem List   Diagnosis     Moderate persistent asthma     Chronic allergic rhinitis due to animal hair and dander     Rheumatoid arthritis (H)     Headache     Migraine without aura        Allergies:  No Known Allergies     Current Meds:    Current Outpatient Prescriptions:      Abatacept (ORENCIA) 125 MG/ML SOLN, Inject 10 mLs Subcutaneous every 7 days, Disp: , Rfl:      albuterol (VENTOLIN HFA) 108 (90 BASE) MCG/ACT inhaler, Inhale 2 puffs into the lungs every 4 hours as needed 2 puffs every 4 hours for cough or wheeze.  2 puffs 15 minutes before exercise., Disp: 1 Inhaler, Rfl: 2     azelastine (OPTIVAR) 0.05 % SOLN ophthalmic solution, Apply 1 drop to eye 2 times daily as needed, Disp: 1 Bottle, Rfl: 0     beclomethasone (QVAR) 80 MCG/ACT Inhaler, 1 puff into both nares twice daily with adaptor Needs to be seen for further refills, Disp: 1 Inhaler, Rfl: 1     budesonide-formoterol (SYMBICORT) 160-4.5 MCG/ACT Inhaler, Inhale 2 puffs into the lungs 2 times daily, Disp: 1 Inhaler, Rfl: 1     cetirizine (ZYRTEC) 10 MG tablet, Take 1 tablet (10 mg) by mouth every evening, Disp: 30 tablet, Rfl: 1     fluticasone (FLONASE) 50 MCG/ACT spray, Spray 2 sprays into both nostrils daily, Disp: 1 Bottle, Rfl: 11     levonorgestrel (MIRENA) 20 MCG/24HR IUD, 1 Device by Intrauterine route, Disp: , Rfl:      montelukast (SINGULAIR) 10 MG tablet, Take 1 tablet (10 mg) by mouth At Bedtime, Disp: 90 tablet, Rfl: 3     azelastine (ASTELIN) 0.1 % nasal spray, Spray 2 sprays into both nostrils 2 times daily as needed for rhinitis (Patient not taking: Reported on  9/18/2017), Disp: 1 Bottle, Rfl: 1     benzonatate (TESSALON) 100 MG capsule, Take 1 capsule (100 mg) by mouth 3 times daily as needed (Patient not taking: Reported on 8/11/2018), Disp: 42 capsule, Rfl: 0     HYDROcodone-acetaminophen (NORCO) 5-325 MG per tablet, Take 1-2 tablets by mouth every 4 hours as needed for moderate to severe pain (Patient not taking: Reported on 8/11/2018), Disp: 15 tablet, Rfl: 0  No current facility-administered medications for this visit.     Facility-Administered Medications Ordered in Other Visits:      lidocaine 1 % injection, , , PRN, Jonny Mayer, APRN CRNA, 5 mL at 04/19/15 1723     PHYSICAL EXAM:     Vital signs noted and reviewed by Esdras Carrillo  /67  Pulse 68  Temp 98.4  F (36.9  C) (Tympanic)  Resp 16  Wt 151 lb 9.6 oz (68.8 kg)  SpO2 99%  BMI 25.26 kg/m2     PEFR:    Physical Exam   Constitutional: She is oriented to person, place, and time. She appears well-developed and well-nourished. No distress.   HENT:   Head: Normocephalic and atraumatic.   Right Ear: Tympanic membrane and external ear normal.   Left Ear: Tympanic membrane and external ear normal.   Nose: Mucosal edema and rhinorrhea present. Right sinus exhibits no maxillary sinus tenderness and no frontal sinus tenderness. Left sinus exhibits no maxillary sinus tenderness and no frontal sinus tenderness.   Mouth/Throat: Oropharynx is clear and moist.   Eyes: EOM are normal. Pupils are equal, round, and reactive to light.   Neck: Normal range of motion. Neck supple.   Cardiovascular: Normal rate, regular rhythm, normal heart sounds and intact distal pulses.  Exam reveals no gallop and no friction rub.    No murmur heard.  Pulmonary/Chest: Effort normal and breath sounds normal. No respiratory distress.   Abdominal: Soft. Bowel sounds are normal. She exhibits no distension and no mass. There is no tenderness. There is no guarding.   Lymphadenopathy:     She has no cervical adenopathy.   Neurological:  She is alert and oriented to person, place, and time. She has normal reflexes. No cranial nerve deficit.   Skin: Skin is warm and dry. She is not diaphoretic.   Psychiatric: She has a normal mood and affect. Her behavior is normal. Judgment and thought content normal.   Nursing note and vitals reviewed.            ASSESSMENT:     1. PND (post-nasal drip)    2. Acute maxillary sinusitis, recurrence not specified         PLAN:     Rx for flonase for post nasal drip.  Start nasal saline rinses 3 times per day.  Vaporizer.  Patient instructed to return in 1-2 weeks if symptoms worsen  Worrisome symptoms discussed with instructions to go to the ED.  Patient verbalized understanding and agreed with this plan.     Esdras Carrillo  8/11/2018, 3:09 PM

## 2018-08-11 NOTE — MR AVS SNAPSHOT
After Visit Summary   8/11/2018    Suha Jean    MRN: [id number]           Patient Information     Date Of Birth          1985        Visit Information        Provider Department      8/11/2018 2:55 PM Esdras Carrillo PA-C [facility]        Today's Diagnoses     PND (post-nasal drip)    -  1    Acute maxillary sinusitis, recurrence not specified           Follow-ups after your visit        Who to contact     If you have questions or need follow up information about today's clinic visit or your schedule please contact [facility] directly at [phone].  Normal or non-critical lab and imaging results will be communicated to you by MyChart, letter or phone within 4 business days after the clinic has received the results. If you do not hear from us within 7 days, please contact the clinic through MyChart or phone. If you have a critical or abnormal lab result, we will notify you by phone as soon as possible.  Submit refill requests through [employer] or call your pharmacy and they will forward the refill request to us. Please allow 3 business days for your refill to be completed.          Additional Information About Your Visit        Care EveryWhere ID     This is your Care EveryWhere ID. This could be used by other organizations to access your [employer] medical records  [id number]        Your Vitals Were     Pulse Temperature Respirations Pulse Oximetry BMI (Body Mass Index)       68 98.4  F (36.9  C) (Tympanic) 16 99% 25.26 kg/m2        Blood Pressure from Last 3 Encounters:   08/11/18 117/67   01/21/18 122/80   01/08/18 110/72    Weight from Last 3 Encounters:   08/11/18 151 lb 9.6 oz (68.8 kg)   01/08/18 156 lb 4.9 oz (70.9 kg)   09/18/17 147 lb 7.8 oz (66.9 kg)              Today, you had the following     No orders found for display         Today's Medication Changes          These changes are accurate as of 8/11/18   3:18 PM.  If you have any questions, ask your nurse or doctor.               Start taking these medicines.        Dose/Directions    fluticasone 50 MCG/ACT spray   Commonly known as:  FLONASE   Used for:  PND (post-nasal drip)   Started by:  Esdras Carrillo PA-C        Dose:  2 spray   Spray 2 sprays into both nostrils daily   Quantity:  1 Bottle   Refills:  11            Where to get your medicines      These medications were sent to [id number] - [facility] [address]  [address] [facility] [address]    Hours:  Closed 10-16-08 business to [facility] Phone:  [phone]     fluticasone 50 MCG/ACT spray                Primary Care Provider Office Phone # Fax #    [facility] [phone] [address] [place] [id number]        Equal Access to Services     BRIGIDA RIZVI : Hadii ariela henry hadasho Sochivo, waaxda luqadaha, qaybta kaalmada adeegyada, glen tai . So [facility] [phone].    ATENCIÓN: Si habla español, tiene a roque disposición servicios gratuitos de asistencia lingüística. Gaudencio al [phone].    We comply with applicable federal civil rights laws and [place] laws. We do not discriminate on the basis of race, color, national origin, age, disability, sex, sexual orientation, or gender identity.            Thank you!     Thank you for choosing [facility]  for your care. Our goal is always to provide you with excellent care. Hearing back from our patients is one way we can continue to improve our services. Please take a few minutes to complete the written survey that you may receive in the mail after your visit with us. Thank you!             Your Updated Medication List - Protect others around you: Learn how to safely use, store and throw away your medicines at www.disposemymeds.org.          This list is accurate as of 8/11/18  3:18 PM.  Always use your most  recent med list.                   Brand Name Dispense Instructions for use Diagnosis    albuterol 108 (90 Base) MCG/ACT inhaler    VENTOLIN HFA    1 Inhaler    Inhale 2 puffs into the lungs every 4 hours as needed 2 puffs every 4 hours for cough or wheeze.  2 puffs 15 minutes before exercise.    Severe persistent asthma       azelastine 0.05 % ophthalmic solution    OPTIVAR    1 Bottle    Apply 1 drop to eye 2 times daily as needed    Allergic conjunctivitis, bilateral       azelastine 0.1 % nasal spray    ASTELIN    1 Bottle    Spray 2 sprays into both nostrils 2 times daily as needed for rhinitis    Moderate persistent asthma with acute exacerbation       beclomethasone 80 MCG/ACT Inhaler    QVAR    1 Inhaler    1 puff into both nares twice daily with adaptor Needs to be seen for further refills    Moderate persistent asthma without complication       benzonatate 100 MG capsule    TESSALON    42 capsule    Take 1 capsule (100 mg) by mouth 3 times daily as needed        budesonide-formoterol 160-4.5 MCG/ACT Inhaler    SYMBICORT    1 Inhaler    Inhale 2 puffs into the lungs 2 times daily    Not well controlled moderate persistent asthma       cetirizine 10 MG tablet    zyrTEC    30 tablet    Take 1 tablet (10 mg) by mouth every evening    Acute sinusitis with symptoms > 10 days       fluticasone 50 MCG/ACT spray    FLONASE    1 Bottle    Spray 2 sprays into both nostrils daily    PND (post-nasal drip)       HYDROcodone-acetaminophen 5-325 MG per tablet    NORCO    15 tablet    Take 1-2 tablets by mouth every 4 hours as needed for moderate to severe pain        levonorgestrel 20 MCG/24HR IUD    MIRENA     1 Device by Intrauterine route    Not well controlled moderate persistent asthma       montelukast 10 MG tablet    SINGULAIR    90 tablet    Take 1 tablet (10 mg) by mouth At Bedtime    Not well controlled moderate persistent asthma       ORENCIA 125 MG/ML Sosy pre-filled syringe   Generic drug:  Abatacept       Inject 10 mLs Subcutaneous every 7 days

## 2018-08-14 ENCOUNTER — OFFICE VISIT (OUTPATIENT)
Dept: FAMILY MEDICINE | Facility: CLINIC | Age: 33
End: 2018-08-14
Payer: COMMERCIAL

## 2018-08-14 VITALS
HEIGHT: 65 IN | TEMPERATURE: 97.3 F | RESPIRATION RATE: 14 BRPM | DIASTOLIC BLOOD PRESSURE: 71 MMHG | BODY MASS INDEX: 25.33 KG/M2 | HEART RATE: 61 BPM | SYSTOLIC BLOOD PRESSURE: 106 MMHG | OXYGEN SATURATION: 100 % | WEIGHT: 152 LBS

## 2018-08-14 DIAGNOSIS — J01.01 ACUTE RECURRENT MAXILLARY SINUSITIS: Primary | ICD-10-CM

## 2018-08-14 DIAGNOSIS — J45.40 MODERATE PERSISTENT ASTHMA WITHOUT COMPLICATION: ICD-10-CM

## 2018-08-14 PROCEDURE — 99214 OFFICE O/P EST MOD 30 MIN: CPT | Performed by: NURSE PRACTITIONER

## 2018-08-14 ASSESSMENT — PAIN SCALES - GENERAL: PAINLEVEL: NO PAIN (0)

## 2018-08-14 NOTE — PATIENT INSTRUCTIONS
Sinusitis (Antibiotic Treatment)    The sinuses are air-filled spaces within the bones of the face. They connect to the inside of the nose. Sinusitis is an inflammation of the tissue that lines the sinuses. Sinusitis can occur during a cold. It can also happen due to allergies to pollens and other particles in the air. Sinusitis can cause symptoms of sinus congestion and a feeling of fullness. A sinus infection causes fever, headache, and facial pain. There is often green or yellow fluid draining from the nose or into the back of the throat (post-nasal drip). You have been given antibiotics to treat this condition.  Home care    Take the full course of antibiotics as instructed. Do not stop taking them, even when you feel better.    Drink plenty of water, hot tea, and other liquids. This may help thin nasal mucus. It also may help your sinuses drain fluids.    Heat may help soothe painful areas of your face. Use a towel soaked in hot water. Or,  the shower and direct the warm spray onto your face. Using a vaporizer along with a menthol rub at night may also help soothe symptoms.     An expectorant with guaifenesin may help thin nasal mucus and help your sinuses drain fluids.    You can use an over-the-counter decongestant, unless a similar medicine was prescribed to you. Nasal sprays work the fastest. Use one that contains phenylephrine or oxymetazoline. First blow your nose gently. Then use the spray. Do not use these medicines more often than directed on the label. If you do, your symptoms may get worse. You may also take pills that contain pseudoephedrine. Don t use products that combine multiple medicines. This is because side effects may be increased. Read labels. You can also ask the pharmacist for help. (People with high blood pressure should not use decongestants. They can raise blood pressure.)    Over-the-counter antihistamines may help if allergies contributed to your sinusitis.      Do not use  nasal rinses or irrigation during an acute sinus infection, unless your healthcare provider tells you to. Rinsing may spread the infection to other areas in your sinuses.    Use acetaminophen or ibuprofen to control pain, unless another pain medicine was prescribed to you. If you have chronic liver or kidney disease or ever had a stomach ulcer, talk with your healthcare provider before using these medicines. (Aspirin should never be taken by anyone under age 18 who is ill with a fever. It may cause severe liver damage.)    Don't smoke. This can make symptoms worse.  Follow-up care  Follow up with your healthcare provider or our staff if you are better in 1 week.  When to seek medical advice  Call your healthcare provider if any of these occur:    Facial pain or headache that gets worse    Stiff neck    Unusual drowsiness or confusion    Swelling of your forehead or eyelids    Vision problems, such as blurred or double vision    Fever of 100.4 F (38 C) or higher, or as directed by your healthcare provider    Seizure    Breathing problems    Symptoms don't go away in 10 days  Prevention  Here are steps you can take to help prevent an infection:    Keep good hand washing habits.    Don t have close contact with people who have sore throats, colds, or other upper respiratory infections.    Don t smoke, and stay away from secondhand smoke.    Stay up to date with of your vaccines.  Date Last Reviewed: 11/1/2017 2000-2017 [employer]. [address] [place] [address], [place] [address]. All rights reserved. This information is not intended as a substitute for professional medical care. Always follow your healthcare professional's instructions.        Self-Care for Sinusitis     Drinking plenty of water can help sinuses drain.   Sinusitis can often be managed with self-care. Self-care can keep sinuses moist and make you feel more comfortable. Remember to follow your doctor's instructions closely. This can make a big  difference in getting your sinus problem under control.  Drink fluids  Drinking extra fluids helps thin your mucus. This lets it drain from your sinuses more easily. Have a glass of water every hour or two. A humidifier helps in much the same way. Fluids can also offset the drying effects of certain medicines. If you use a humidifier, follow the product maker's instructions on how to use it. Clean it on a regular schedule.  Use saltwater rinses  Rinses help keep your sinuses and nose moist. Mix a teaspoon of salt in 8 ounces of fresh, warm water. Use a bulb syringe to gently squirt the water into your nose a few times a day. You can also buy ready-made saline nasal sprays.  Apply hot or cold packs  Applying heat to the area surrounding your sinuses may make you feel more comfortable. Use a hot water bottle or a hand towel dipped in hot water. Some people also find ice packs effective for relieving pain.  Medicines  Your doctor may prescribe medications to help treat your sinusitis. If you have an infection, antibiotics can help clear it up. If you are prescribed antibiotics, take all pills on schedule until they are gone, even if you feel better. Decongestants help relieve swelling. Use decongestant sprays for short periods only under the direction of your doctor. If you have allergies, your doctor may prescribe medications to help relieve them.   Date Last Reviewed: 10/1/2016    2682-2251 [employer]. [address] [place] [address], [place] [address]. All rights reserved. This information is not intended as a substitute for professional medical care. Always follow your healthcare professional's instructions.

## 2018-08-14 NOTE — PROGRESS NOTES
"  SUBJECTIVE:   Suha Jean is a 33 year old female who presents to clinic today for the following health issues:      SINUS SYMPTOMS      Duration: 2 weeks     Description  nasal congestion, rhinorrhea, facial pain/pressure, headache and fatigue/malaise    Severity: moderate    Accompanying signs and symptoms: pressure in head     History (predisposing factors):  asthma    Precipitating or alleviating factors: None    Therapies tried and outcome:  rest and fluids oral decongestant    No relief from hot shower and horseradish as directed by  provider per patient report.  Right frontal sinus pressure and pain.  Sleep and Ability to perform her ADLs has been affected.    -------------------------------------    Problem list and histories reviewed & adjusted, as indicated.  Additional history: as documented    Labs reviewed in EPIC    Reviewed and updated as needed this visit by clinical staff  Allergies  Meds       Reviewed and updated as needed this visit by Provider         ROS:   ROS: 10 point ROS neg other than the symptoms noted above in the HPI.    OBJECTIVE:                                                    /71  Pulse 61  Temp 97.3  F (36.3  C) (Tympanic)  Resp 14  Ht 5' 5\" (1.651 m)  Wt 152 lb (68.9 kg)  SpO2 100%  BMI 25.29 kg/m2  Body mass index is 25.29 kg/(m^2).   GENERAL: healthy, alert, well nourished, well hydrated, no distress  HENT: ear canals- normal; TMs- normal; Nose-turbinates are red boggy and engorged.  There is pain with palpation of the frontal and maxillary sinus on the right.  Mouth- no ulcers, no lesions  NECK: no tenderness, no adenopathy, no asymmetry, no masses, no stiffness; thyroid- normal to palpation  RESP: lungs clear to auscultation - no rales, no rhonchi, no wheezes  CV: regular rates and rhythm, normal S1 S2, no S3 or S4 and no murmur, no click or rub -  ABDOMEN: soft, no tenderness, no  hepatosplenomegaly, no masses, normal bowel sounds  MS: extremities- no " gross deformities noted, no edema  SKIN: no suspicious lesions, no rashes    Diagnostic test results:  none      ASSESSMENT/PLAN:                                                    1. Acute recurrent maxillary sinusitis  Saline nasal irrigation recommended.  Continue current allergy medication.  Begin oral antibiotics  - amoxicillin-clavulanate (AUGMENTIN) 875-125 MG per tablet; Take 1 tablet by mouth 2 times daily  Dispense: 20 tablet; Refill: 1    2. Moderate persistent asthma without complication  Ongoing.  Continue Symbicort.  Continue Singulair.  Continue Qvar.  Continue Zyrtec.  Follow-up with asthma allergy with ongoing symptoms      Follow up with Provider -Friday with ongoing symptoms  Call or return to the clinic with any worsening of symptoms or no resolution. Patient/Parent verbalized understanding and is in agreement. Medication side effects reviewed.     Current Outpatient Prescriptions   Medication Sig Dispense Refill     amoxicillin-clavulanate (AUGMENTIN) 875-125 MG per tablet Take 1 tablet by mouth 2 times daily 20 tablet 1     Abatacept (ORENCIA) 125 MG/ML SOLN Inject 10 mLs Subcutaneous every 7 days       albuterol (VENTOLIN HFA) 108 (90 BASE) MCG/ACT inhaler Inhale 2 puffs into the lungs every 4 hours as needed 2 puffs every 4 hours for cough or wheeze.  2 puffs 15 minutes before exercise. 1 Inhaler 2     azelastine (ASTELIN) 0.1 % nasal spray Spray 2 sprays into both nostrils 2 times daily as needed for rhinitis (Patient not taking: Reported on 9/18/2017) 1 Bottle 1     azelastine (OPTIVAR) 0.05 % SOLN ophthalmic solution Apply 1 drop to eye 2 times daily as needed 1 Bottle 0     beclomethasone (QVAR) 80 MCG/ACT Inhaler 1 puff into both nares twice daily with adaptor  Needs to be seen for further refills 1 Inhaler 1     budesonide-formoterol (SYMBICORT) 160-4.5 MCG/ACT Inhaler Inhale 2 puffs into the lungs 2 times daily 1 Inhaler 1     cetirizine (ZYRTEC) 10 MG tablet Take 1 tablet (10 mg) by  mouth every evening 30 tablet 1     HYDROcodone-acetaminophen (NORCO) 5-325 MG per tablet Take 1-2 tablets by mouth every 4 hours as needed for moderate to severe pain (Patient not taking: Reported on 8/11/2018) 15 tablet 0     levonorgestrel (MIRENA) 20 MCG/24HR IUD 1 Device by Intrauterine route       montelukast (SINGULAIR) 10 MG tablet Take 1 tablet (10 mg) by mouth At Bedtime 90 tablet 3        See Patient Instructions    JEANNA Porter [facility]

## 2018-08-14 NOTE — MR AVS SNAPSHOT
After Visit Summary   8/14/2018    Suha Jean    MRN: [id number]           Patient Information     Date Of Birth          1985        Visit Information        Provider Department      8/14/2018 9:20 AM Roxann Cotto APRN CNP [facility]        Today's Diagnoses     Acute recurrent maxillary sinusitis    -  1      Care Instructions      Sinusitis (Antibiotic Treatment)    The sinuses are air-filled spaces within the bones of the face. They connect to the inside of the nose. Sinusitis is an inflammation of the tissue that lines the sinuses. Sinusitis can occur during a cold. It can also happen due to allergies to pollens and other particles in the air. Sinusitis can cause symptoms of sinus congestion and a feeling of fullness. A sinus infection causes fever, headache, and facial pain. There is often green or yellow fluid draining from the nose or into the back of the throat (post-nasal drip). You have been given antibiotics to treat this condition.  Home care    Take the full course of antibiotics as instructed. Do not stop taking them, even when you feel better.    Drink plenty of water, hot tea, and other liquids. This may help thin nasal mucus. It also may help your sinuses drain fluids.    Heat may help soothe painful areas of your face. Use a towel soaked in hot water. Or,  the shower and direct the warm spray onto your face. Using a vaporizer along with a menthol rub at night may also help soothe symptoms.     An expectorant with guaifenesin may help thin nasal mucus and help your sinuses drain fluids.    You can use an over-the-counter decongestant, unless a similar medicine was prescribed to you. Nasal sprays work the fastest. Use one that contains phenylephrine or oxymetazoline. First blow your nose gently. Then use the spray. Do not use these medicines more often than directed on the label. If you do, your symptoms may get worse. You may also take  pills that contain pseudoephedrine. Don t use products that combine multiple medicines. This is because side effects may be increased. Read labels. You can also ask the pharmacist for help. (People with high blood pressure should not use decongestants. They can raise blood pressure.)    Over-the-counter antihistamines may help if allergies contributed to your sinusitis.      Do not use nasal rinses or irrigation during an acute sinus infection, unless your healthcare provider tells you to. Rinsing may spread the infection to other areas in your sinuses.    Use acetaminophen or ibuprofen to control pain, unless another pain medicine was prescribed to you. If you have chronic liver or kidney disease or ever had a stomach ulcer, talk with your healthcare provider before using these medicines. (Aspirin should never be taken by anyone under age 18 who is ill with a fever. It may cause severe liver damage.)    Don't smoke. This can make symptoms worse.  Follow-up care  Follow up with your healthcare provider or our staff if you are better in 1 week.  When to seek medical advice  Call your healthcare provider if any of these occur:    Facial pain or headache that gets worse    Stiff neck    Unusual drowsiness or confusion    Swelling of your forehead or eyelids    Vision problems, such as blurred or double vision    Fever of 100.4 F (38 C) or higher, or as directed by your healthcare provider    Seizure    Breathing problems    Symptoms don't go away in 10 days  Prevention  Here are steps you can take to help prevent an infection:    Keep good hand washing habits.    Don t have close contact with people who have sore throats, colds, or other upper respiratory infections.    Don t smoke, and stay away from secondhand smoke.    Stay up to date with of your vaccines.  Date Last Reviewed: 11/1/2017 2000-2017 [employer]. [address] [place] [address], [place] [address]. All rights reserved. This information is not  intended as a substitute for professional medical care. Always follow your healthcare professional's instructions.        Self-Care for Sinusitis     Drinking plenty of water can help sinuses drain.   Sinusitis can often be managed with self-care. Self-care can keep sinuses moist and make you feel more comfortable. Remember to follow your doctor's instructions closely. This can make a big difference in getting your sinus problem under control.  Drink fluids  Drinking extra fluids helps thin your mucus. This lets it drain from your sinuses more easily. Have a glass of water every hour or two. A humidifier helps in much the same way. Fluids can also offset the drying effects of certain medicines. If you use a humidifier, follow the product maker's instructions on how to use it. Clean it on a regular schedule.  Use saltwater rinses  Rinses help keep your sinuses and nose moist. Mix a teaspoon of salt in 8 ounces of fresh, warm water. Use a bulb syringe to gently squirt the water into your nose a few times a day. You can also buy ready-made saline nasal sprays.  Apply hot or cold packs  Applying heat to the area surrounding your sinuses may make you feel more comfortable. Use a hot water bottle or a hand towel dipped in hot water. Some people also find ice packs effective for relieving pain.  Medicines  Your doctor may prescribe medications to help treat your sinusitis. If you have an infection, antibiotics can help clear it up. If you are prescribed antibiotics, take all pills on schedule until they are gone, even if you feel better. Decongestants help relieve swelling. Use decongestant sprays for short periods only under the direction of your doctor. If you have allergies, your doctor may prescribe medications to help relieve them.   Date Last Reviewed: 10/1/2016    2496-2013 [employer]. [address] [place] [address], [place] [address]. All rights reserved. This information is not intended as a substitute for  "professional medical care. Always follow your healthcare professional's instructions.                Follow-ups after your visit        Who to contact     If you have questions or need follow up information about today's clinic visit or your schedule please contact [facility] directly at [phone].  Normal or non-critical lab and imaging results will be communicated to you by MyChart, letter or phone within 4 business days after the clinic has received the results. If you do not hear from us within 7 days, please contact the clinic through MyChart or phone. If you have a critical or abnormal lab result, we will notify you by phone as soon as possible.  Submit refill requests through [employer] or call your pharmacy and they will forward the refill request to us. Please allow 3 business days for your refill to be completed.          Additional Information About Your Visit        Care EveryWhere ID     This is your Care EveryWhere ID. This could be used by other organizations to access your [facility] medical records  [id number]        Your Vitals Were     Pulse Temperature Respirations Height Pulse Oximetry BMI (Body Mass Index)    61 97.3  F (36.3  C) (Tympanic) 14 5' 5\" (1.651 m) 100% 25.29 kg/m2       Blood Pressure from Last 3 Encounters:   08/14/18 106/71   08/11/18 117/67   01/21/18 122/80    Weight from Last 3 Encounters:   08/14/18 152 lb (68.9 kg)   08/11/18 151 lb 9.6 oz (68.8 kg)   01/08/18 156 lb 4.9 oz (70.9 kg)              Today, you had the following     No orders found for display         Today's Medication Changes          These changes are accurate as of 8/14/18 10:23 AM.  If you have any questions, ask your nurse or doctor.               Start taking these medicines.        Dose/Directions    amoxicillin-clavulanate 875-125 MG per tablet   Commonly known as:  AUGMENTIN   Used for:  Acute recurrent maxillary sinusitis   Started by:  Roxann Cotto APRN CNP        Dose:  " 1 tablet   Take 1 tablet by mouth 2 times daily   Quantity:  20 tablet   Refills:  1         Stop taking these medicines if you haven't already. Please contact your care team if you have questions.     benzonatate 100 MG capsule   Commonly known as:  TESSALON   Stopped by:  Roxann Cotto APRN CNP           fluticasone 50 MCG/ACT spray   Commonly known as:  FLONASE   Stopped by:  Roxann Cotto APRN CNP                Where to get your medicines      These medications were sent to [facility] [place] [address]     Phone:  [phone]     amoxicillin-clavulanate 875-125 MG per tablet                Primary Care Provider Office Phone # Fax #    [facility] [phone]       [address] [place] [id number]        Equal Access to Services     [facility] : Hadii ariela henry hadasho Soomaali, waaxda luqadaha, qaybta kaalmada adepatyyada, glen tai . So [facility] [phone].    ATENCIÓN: Si habla español, tiene a roque disposición servicios gratuitos de asistencia lingüística. Gaudencio al [phone].    We comply with applicable federal civil rights laws and [place] laws. We do not discriminate on the basis of race, color, national origin, age, disability, sex, sexual orientation, or gender identity.            Thank you!     Thank you for choosing [facility]  for your care. Our goal is always to provide you with excellent care. Hearing back from our patients is one way we can continue to improve our services. Please take a few minutes to complete the written survey that you may receive in the mail after your visit with us. Thank you!             Your Updated Medication List - Protect others around you: Learn how to safely use, store and throw away your medicines at www.disposemymeds.org.          This list is accurate as of 8/14/18 10:23 AM.   Always use your most recent med list.                   Brand Name Dispense Instructions for use Diagnosis    albuterol 108 (90 Base) MCG/ACT inhaler    VENTOLIN HFA    1 Inhaler    Inhale 2 puffs into the lungs every 4 hours as needed 2 puffs every 4 hours for cough or wheeze.  2 puffs 15 minutes before exercise.    Severe persistent asthma       amoxicillin-clavulanate 875-125 MG per tablet    AUGMENTIN    20 tablet    Take 1 tablet by mouth 2 times daily    Acute recurrent maxillary sinusitis       azelastine 0.05 % ophthalmic solution    OPTIVAR    1 Bottle    Apply 1 drop to eye 2 times daily as needed    Allergic conjunctivitis, bilateral       azelastine 0.1 % nasal spray    ASTELIN    1 Bottle    Spray 2 sprays into both nostrils 2 times daily as needed for rhinitis    Moderate persistent asthma with acute exacerbation       beclomethasone 80 MCG/ACT Inhaler    QVAR    1 Inhaler    1 puff into both nares twice daily with adaptor Needs to be seen for further refills    Moderate persistent asthma without complication       budesonide-formoterol 160-4.5 MCG/ACT Inhaler    SYMBICORT    1 Inhaler    Inhale 2 puffs into the lungs 2 times daily    Not well controlled moderate persistent asthma       cetirizine 10 MG tablet    zyrTEC    30 tablet    Take 1 tablet (10 mg) by mouth every evening    Acute sinusitis with symptoms > 10 days       HYDROcodone-acetaminophen 5-325 MG per tablet    NORCO    15 tablet    Take 1-2 tablets by mouth every 4 hours as needed for moderate to severe pain        levonorgestrel 20 MCG/24HR IUD    MIRENA     1 Device by Intrauterine route    Not well controlled moderate persistent asthma       montelukast 10 MG tablet    SINGULAIR    90 tablet    Take 1 tablet (10 mg) by mouth At Bedtime    Not well controlled moderate persistent asthma       ORENCIA 125 MG/ML Sosy pre-filled syringe   Generic drug:  Abatacept      Inject 10 mLs Subcutaneous every 7 days

## 2018-08-15 ASSESSMENT — ASTHMA QUESTIONNAIRES: ACT_TOTALSCORE: 20

## 2018-10-18 ENCOUNTER — TELEPHONE (OUTPATIENT)
Dept: FAMILY MEDICINE | Facility: CLINIC | Age: 33
End: 2018-10-18

## 2018-10-18 DIAGNOSIS — J45.40 MODERATE PERSISTENT ASTHMA WITHOUT COMPLICATION: ICD-10-CM

## 2018-10-18 NOTE — TELEPHONE ENCOUNTER
See note below.  QVAR d/c'd - her last RX was back in 2016  Qnasl ready - please verify sig.    Routing to last provider she has seen, Roxann Cotto.  Kaitlynn WHALEN RN

## 2018-10-18 NOTE — TELEPHONE ENCOUNTER
Patient is requesting refill of Qvar 80, which has been reformulated to a redihaler. This will not work for intranasal use, please change to Qnasl. Please change order for refills.  Thanks,   Sara Spain  Certified Pharmacy Technician  [facility]  [phone]

## 2018-10-19 DIAGNOSIS — M06.9 RA (RHEUMATOID ARTHRITIS) (H): ICD-10-CM

## 2018-10-19 DIAGNOSIS — Z79.899 LONG TERM USE OF DRUG: ICD-10-CM

## 2018-10-19 LAB
ALBUMIN SERPL-MCNC: 3.9 G/DL (ref 3.4–5)
ALT SERPL W P-5'-P-CCNC: 30 U/L (ref 0–50)
AST SERPL W P-5'-P-CCNC: 18 U/L (ref 0–45)
CREAT SERPL-MCNC: 0.67 MG/DL (ref 0.52–1.04)
CRP SERPL-MCNC: <2.9 MG/L (ref 0–8)
DEPRECATED CALCIDIOL+CALCIFEROL SERPL-MC: 51 UG/L (ref 20–75)
ERYTHROCYTE [DISTWIDTH] IN BLOOD BY AUTOMATED COUNT: 11.5 % (ref 10–15)
ERYTHROCYTE [SEDIMENTATION RATE] IN BLOOD BY WESTERGREN METHOD: 5 MM/H (ref 0–20)
GFR SERPL CREATININE-BSD FRML MDRD: >90 ML/MIN/1.7M2
HCT VFR BLD AUTO: 41.8 % (ref 35–47)
HGB BLD-MCNC: 14.3 G/DL (ref 11.7–15.7)
MCH RBC QN AUTO: 32.8 PG (ref 26.5–33)
MCHC RBC AUTO-ENTMCNC: 34.2 G/DL (ref 31.5–36.5)
MCV RBC AUTO: 96 FL (ref 78–100)
PLATELET # BLD AUTO: 208 10E9/L (ref 150–450)
RBC # BLD AUTO: 4.36 10E12/L (ref 3.8–5.2)
WBC # BLD AUTO: 7 10E9/L (ref 4–11)

## 2018-10-19 PROCEDURE — 36415 COLL VENOUS BLD VENIPUNCTURE: CPT | Performed by: INTERNAL MEDICINE

## 2018-10-19 PROCEDURE — 84450 TRANSFERASE (AST) (SGOT): CPT | Performed by: INTERNAL MEDICINE

## 2018-10-19 PROCEDURE — 86140 C-REACTIVE PROTEIN: CPT | Performed by: INTERNAL MEDICINE

## 2018-10-19 PROCEDURE — 84460 ALANINE AMINO (ALT) (SGPT): CPT | Performed by: INTERNAL MEDICINE

## 2018-10-19 PROCEDURE — 85027 COMPLETE CBC AUTOMATED: CPT | Performed by: INTERNAL MEDICINE

## 2018-10-19 PROCEDURE — 82306 VITAMIN D 25 HYDROXY: CPT

## 2018-10-19 PROCEDURE — 82565 ASSAY OF CREATININE: CPT | Performed by: INTERNAL MEDICINE

## 2018-10-19 PROCEDURE — 82040 ASSAY OF SERUM ALBUMIN: CPT | Performed by: INTERNAL MEDICINE

## 2018-10-19 PROCEDURE — 85652 RBC SED RATE AUTOMATED: CPT | Performed by: INTERNAL MEDICINE

## 2019-05-30 DIAGNOSIS — M06.89 OTHER SPECIFIED RHEUMATOID ARTHRITIS, MULTIPLE SITES (H): Primary | ICD-10-CM

## 2019-05-30 DIAGNOSIS — M06.9 RA (RHEUMATOID ARTHRITIS) (H): ICD-10-CM

## 2019-05-30 DIAGNOSIS — Z79.899 NEED FOR PROPHYLACTIC CHEMOTHERAPY: ICD-10-CM

## 2019-05-30 DIAGNOSIS — Z79.899 LONG TERM USE OF DRUG: ICD-10-CM

## 2019-05-30 LAB
ALBUMIN SERPL-MCNC: 3.7 G/DL (ref 3.4–5)
ALT SERPL W P-5'-P-CCNC: 35 U/L (ref 0–50)
AST SERPL W P-5'-P-CCNC: 21 U/L (ref 0–45)
CREAT SERPL-MCNC: 0.68 MG/DL (ref 0.52–1.04)
CRP SERPL-MCNC: <2.9 MG/L (ref 0–8)
ERYTHROCYTE [DISTWIDTH] IN BLOOD BY AUTOMATED COUNT: 11.6 % (ref 10–15)
ERYTHROCYTE [SEDIMENTATION RATE] IN BLOOD BY WESTERGREN METHOD: 4 MM/H (ref 0–20)
GFR SERPL CREATININE-BSD FRML MDRD: >90 ML/MIN/{1.73_M2}
HCT VFR BLD AUTO: 38.5 % (ref 35–47)
HGB BLD-MCNC: 13.3 G/DL (ref 11.7–15.7)
MCH RBC QN AUTO: 33 PG (ref 26.5–33)
MCHC RBC AUTO-ENTMCNC: 34.5 G/DL (ref 31.5–36.5)
MCV RBC AUTO: 96 FL (ref 78–100)
PLATELET # BLD AUTO: 226 10E9/L (ref 150–450)
RBC # BLD AUTO: 4.03 10E12/L (ref 3.8–5.2)
WBC # BLD AUTO: 5.8 10E9/L (ref 4–11)

## 2019-05-30 PROCEDURE — 85027 COMPLETE CBC AUTOMATED: CPT | Performed by: INTERNAL MEDICINE

## 2019-05-30 PROCEDURE — 84460 ALANINE AMINO (ALT) (SGPT): CPT | Performed by: INTERNAL MEDICINE

## 2019-05-30 PROCEDURE — 86140 C-REACTIVE PROTEIN: CPT | Performed by: INTERNAL MEDICINE

## 2019-05-30 PROCEDURE — 82565 ASSAY OF CREATININE: CPT | Performed by: INTERNAL MEDICINE

## 2019-05-30 PROCEDURE — 36415 COLL VENOUS BLD VENIPUNCTURE: CPT | Performed by: INTERNAL MEDICINE

## 2019-05-30 PROCEDURE — 82040 ASSAY OF SERUM ALBUMIN: CPT | Performed by: INTERNAL MEDICINE

## 2019-05-30 PROCEDURE — 85652 RBC SED RATE AUTOMATED: CPT | Performed by: INTERNAL MEDICINE

## 2019-05-30 PROCEDURE — 84450 TRANSFERASE (AST) (SGOT): CPT | Performed by: INTERNAL MEDICINE

## 2019-08-19 DIAGNOSIS — J01.01 ACUTE RECURRENT MAXILLARY SINUSITIS: ICD-10-CM

## 2019-08-21 DIAGNOSIS — J01.01 ACUTE RECURRENT MAXILLARY SINUSITIS: ICD-10-CM

## 2019-08-25 ENCOUNTER — MEDICAL CORRESPONDENCE (OUTPATIENT)
Dept: HEALTH INFORMATION MANAGEMENT | Facility: CLINIC | Age: 34
End: 2019-08-25

## 2019-09-03 ENCOUNTER — OFFICE VISIT (OUTPATIENT)
Dept: FAMILY MEDICINE | Facility: CLINIC | Age: 34
End: 2019-09-03
Payer: COMMERCIAL

## 2019-09-03 VITALS
BODY MASS INDEX: 25.66 KG/M2 | HEIGHT: 65 IN | RESPIRATION RATE: 17 BRPM | HEART RATE: 72 BPM | DIASTOLIC BLOOD PRESSURE: 62 MMHG | SYSTOLIC BLOOD PRESSURE: 118 MMHG | WEIGHT: 154 LBS

## 2019-09-03 DIAGNOSIS — R53.83 FATIGUE, UNSPECIFIED TYPE: ICD-10-CM

## 2019-09-03 DIAGNOSIS — Z11.3 SCREEN FOR STD (SEXUALLY TRANSMITTED DISEASE): Primary | ICD-10-CM

## 2019-09-03 DIAGNOSIS — F41.1 GAD (GENERALIZED ANXIETY DISORDER): ICD-10-CM

## 2019-09-03 LAB
ALBUMIN SERPL-MCNC: 3.8 G/DL (ref 3.4–5)
ALP SERPL-CCNC: 54 U/L (ref 40–150)
ALT SERPL W P-5'-P-CCNC: 22 U/L (ref 0–50)
ANION GAP SERPL CALCULATED.3IONS-SCNC: 6 MMOL/L (ref 3–14)
AST SERPL W P-5'-P-CCNC: 14 U/L (ref 0–45)
BASOPHILS # BLD AUTO: 0 10E9/L (ref 0–0.2)
BASOPHILS NFR BLD AUTO: 0.3 %
BILIRUB SERPL-MCNC: 0.6 MG/DL (ref 0.2–1.3)
BUN SERPL-MCNC: 12 MG/DL (ref 7–30)
CALCIUM SERPL-MCNC: 8.3 MG/DL (ref 8.5–10.1)
CHLORIDE SERPL-SCNC: 109 MMOL/L (ref 94–109)
CO2 SERPL-SCNC: 26 MMOL/L (ref 20–32)
CREAT SERPL-MCNC: 0.59 MG/DL (ref 0.52–1.04)
DIFFERENTIAL METHOD BLD: NORMAL
EOSINOPHIL # BLD AUTO: 0.5 10E9/L (ref 0–0.7)
EOSINOPHIL NFR BLD AUTO: 6.8 %
ERYTHROCYTE [DISTWIDTH] IN BLOOD BY AUTOMATED COUNT: 11.8 % (ref 10–15)
GFR SERPL CREATININE-BSD FRML MDRD: >90 ML/MIN/{1.73_M2}
GLUCOSE SERPL-MCNC: 100 MG/DL (ref 70–99)
HCT VFR BLD AUTO: 40.7 % (ref 35–47)
HGB BLD-MCNC: 13.5 G/DL (ref 11.7–15.7)
LYMPHOCYTES # BLD AUTO: 1.8 10E9/L (ref 0.8–5.3)
LYMPHOCYTES NFR BLD AUTO: 24.3 %
MCH RBC QN AUTO: 32.3 PG (ref 26.5–33)
MCHC RBC AUTO-ENTMCNC: 33.2 G/DL (ref 31.5–36.5)
MCV RBC AUTO: 97 FL (ref 78–100)
MONOCYTES # BLD AUTO: 0.4 10E9/L (ref 0–1.3)
MONOCYTES NFR BLD AUTO: 5.6 %
NEUTROPHILS # BLD AUTO: 4.7 10E9/L (ref 1.6–8.3)
NEUTROPHILS NFR BLD AUTO: 63 %
PLATELET # BLD AUTO: 207 10E9/L (ref 150–450)
POTASSIUM SERPL-SCNC: 4.2 MMOL/L (ref 3.4–5.3)
PROT SERPL-MCNC: 6.5 G/DL (ref 6.8–8.8)
RBC # BLD AUTO: 4.18 10E12/L (ref 3.8–5.2)
SODIUM SERPL-SCNC: 141 MMOL/L (ref 133–144)
TSH SERPL DL<=0.005 MIU/L-ACNC: 1.55 MU/L (ref 0.4–4)
WBC # BLD AUTO: 7.4 10E9/L (ref 4–11)

## 2019-09-03 PROCEDURE — 87591 N.GONORRHOEAE DNA AMP PROB: CPT | Performed by: NURSE PRACTITIONER

## 2019-09-03 PROCEDURE — 87389 HIV-1 AG W/HIV-1&-2 AB AG IA: CPT | Performed by: NURSE PRACTITIONER

## 2019-09-03 PROCEDURE — 86695 HERPES SIMPLEX TYPE 1 TEST: CPT | Performed by: NURSE PRACTITIONER

## 2019-09-03 PROCEDURE — 86803 HEPATITIS C AB TEST: CPT | Performed by: NURSE PRACTITIONER

## 2019-09-03 PROCEDURE — 99214 OFFICE O/P EST MOD 30 MIN: CPT | Performed by: NURSE PRACTITIONER

## 2019-09-03 PROCEDURE — 36415 COLL VENOUS BLD VENIPUNCTURE: CPT | Performed by: NURSE PRACTITIONER

## 2019-09-03 PROCEDURE — 85025 COMPLETE CBC W/AUTO DIFF WBC: CPT | Performed by: NURSE PRACTITIONER

## 2019-09-03 PROCEDURE — 84443 ASSAY THYROID STIM HORMONE: CPT | Performed by: NURSE PRACTITIONER

## 2019-09-03 PROCEDURE — 86696 HERPES SIMPLEX TYPE 2 TEST: CPT | Performed by: NURSE PRACTITIONER

## 2019-09-03 PROCEDURE — 86780 TREPONEMA PALLIDUM: CPT | Performed by: NURSE PRACTITIONER

## 2019-09-03 PROCEDURE — 87491 CHLMYD TRACH DNA AMP PROBE: CPT | Performed by: NURSE PRACTITIONER

## 2019-09-03 PROCEDURE — 80053 COMPREHEN METABOLIC PANEL: CPT | Performed by: NURSE PRACTITIONER

## 2019-09-03 RX ORDER — ESCITALOPRAM OXALATE 10 MG/1
10 TABLET ORAL DAILY
Qty: 31 TABLET | Refills: 2 | Status: SHIPPED | OUTPATIENT
Start: 2019-09-03

## 2019-09-03 ASSESSMENT — PATIENT HEALTH QUESTIONNAIRE - PHQ9
SUM OF ALL RESPONSES TO PHQ QUESTIONS 1-9: 7
5. POOR APPETITE OR OVEREATING: SEVERAL DAYS

## 2019-09-03 ASSESSMENT — ASTHMA QUESTIONNAIRES
QUESTION_3 LAST FOUR WEEKS HOW OFTEN DID YOUR ASTHMA SYMPTOMS (WHEEZING, COUGHING, SHORTNESS OF BREATH, CHEST TIGHTNESS OR PAIN) WAKE YOU UP AT NIGHT OR EARLIER THAN USUAL IN THE MORNING: NOT AT ALL
QUESTION_5 LAST FOUR WEEKS HOW WOULD YOU RATE YOUR ASTHMA CONTROL: COMPLETELY CONTROLLED
QUESTION_4 LAST FOUR WEEKS HOW OFTEN HAVE YOU USED YOUR RESCUE INHALER OR NEBULIZER MEDICATION (SUCH AS ALBUTEROL): NOT AT ALL
QUESTION_1 LAST FOUR WEEKS HOW MUCH OF THE TIME DID YOUR ASTHMA KEEP YOU FROM GETTING AS MUCH DONE AT WORK, SCHOOL OR AT HOME: NONE OF THE TIME
ACT_TOTALSCORE: 24
ACUTE_EXACERBATION_TODAY: NO
QUESTION_2 LAST FOUR WEEKS HOW OFTEN HAVE YOU HAD SHORTNESS OF BREATH: ONCE OR TWICE A WEEK

## 2019-09-03 ASSESSMENT — MIFFLIN-ST. JEOR: SCORE: 1389.42

## 2019-09-03 ASSESSMENT — ANXIETY QUESTIONNAIRES
6. BECOMING EASILY ANNOYED OR IRRITABLE: SEVERAL DAYS
GAD7 TOTAL SCORE: 6
1. FEELING NERVOUS, ANXIOUS, OR ON EDGE: SEVERAL DAYS
5. BEING SO RESTLESS THAT IT IS HARD TO SIT STILL: SEVERAL DAYS
3. WORRYING TOO MUCH ABOUT DIFFERENT THINGS: SEVERAL DAYS
7. FEELING AFRAID AS IF SOMETHING AWFUL MIGHT HAPPEN: NOT AT ALL
2. NOT BEING ABLE TO STOP OR CONTROL WORRYING: SEVERAL DAYS
IF YOU CHECKED OFF ANY PROBLEMS ON THIS QUESTIONNAIRE, HOW DIFFICULT HAVE THESE PROBLEMS MADE IT FOR YOU TO DO YOUR WORK, TAKE CARE OF THINGS AT HOME, OR GET ALONG WITH OTHER PEOPLE: NOT DIFFICULT AT ALL

## 2019-09-03 NOTE — PROGRESS NOTES
"Subjective     Suha Jean is a 34 year old female who presents to clinic today for the following health issues:    HPI   Abnormal Mood Symptoms      Duration: few months     Description:  Depression: no   Anxiety: YES  Panic attacks: no      Accompanying signs and symptoms: Fatigue     History (similar episodes/previous evaluation): yes antidepressants in the past.        Precipitating or alleviating factors: None    Therapies tried and outcome: antidepressant in the past   Going through divorce  History of rheumatoid arthritis  Environmental allergies well controlled.  Breathing has been stable    STD SCREENING desired.       -------------------------------------    Patient Active Problem List   Diagnosis     Moderate persistent asthma     Chronic allergic rhinitis due to animal hair and dander     Rheumatoid arthritis (H)     Headache     Migraine without aura     Past Surgical History:   Procedure Laterality Date     APPENDECTOMY         Social History     Tobacco Use     Smoking status: Never Smoker     Smokeless tobacco: Never Used   Substance Use Topics     Alcohol use: Yes     Alcohol/week: 0.0 oz     Comment: socially     Family History   Problem Relation Age of Onset     Bronchitis Mother      Lupus Mother      Alcoholism Father            -------------------------------------  Reviewed and updated as needed this visit by Provider         Review of Systems   ROS COMP: Constitutional, HEENT, cardiovascular, pulmonary, GI, , musculoskeletal, neuro, skin, endocrine and psych systems are negative, except as otherwise noted.      Objective    /62   Pulse 72   Resp 17   Ht 1.651 m (5' 5\")   Wt 69.9 kg (154 lb)   LMP 09/01/2019   BMI 25.63 kg/m    Body mass index is 25.63 kg/m .  Physical Exam   GENERAL: healthy, alert and no distress  EYES: Eyes grossly normal to inspection, PERRL and conjunctivae and sclerae normal  HENT: ear canals and TM's normal, nose and mouth without ulcers or " lesions  NECK: no adenopathy, no asymmetry, masses, or scars and thyroid normal to palpation  RESP: lungs clear to auscultation - no rales, rhonchi or wheezes  CV: regular rate and rhythm, normal S1 S2, no S3 or S4, no murmur, click or rub, no peripheral edema and peripheral pulses strong  ABDOMEN: soft, nontender, no hepatosplenomegaly, no masses and bowel sounds normal  MS: no gross musculoskeletal defects noted, no edema  SKIN: no suspicious lesions or rashes  NEURO: Normal strength and tone, mentation intact and speech normal  PSYCH: mentation appears normal, affect flat, judgement and insight intact and appearance well groomed    Diagnostic Test Results:  Labs reviewed in Epic  Results for orders placed or performed in visit on 09/03/19   Treponema Abs w Reflex to RPR and Titer   Result Value Ref Range    Treponema Antibodies Nonreactive NR^Nonreactive   HIV Antigen Antibody Combo   Result Value Ref Range    HIV Antigen Antibody Combo Nonreactive NR^Nonreactive       Hepatitis C antibody   Result Value Ref Range    Hepatitis C Antibody Nonreactive NR^Nonreactive   Herpes Simplex Virus 1 and 2 IgG   Result Value Ref Range    Herpes Simplex Virus Type 1 IgG >8.0 (H) 0.0 - 0.8 AI    Herpes Simplex Virus Type 2 IgG <0.2 0.0 - 0.8 AI   CBC with platelets and differential   Result Value Ref Range    WBC 7.4 4.0 - 11.0 10e9/L    RBC Count 4.18 3.8 - 5.2 10e12/L    Hemoglobin 13.5 11.7 - 15.7 g/dL    Hematocrit 40.7 35.0 - 47.0 %    MCV 97 78 - 100 fl    MCH 32.3 26.5 - 33.0 pg    MCHC 33.2 31.5 - 36.5 g/dL    RDW 11.8 10.0 - 15.0 %    Platelet Count 207 150 - 450 10e9/L    % Neutrophils 63.0 %    % Lymphocytes 24.3 %    % Monocytes 5.6 %    % Eosinophils 6.8 %    % Basophils 0.3 %    Absolute Neutrophil 4.7 1.6 - 8.3 10e9/L    Absolute Lymphocytes 1.8 0.8 - 5.3 10e9/L    Absolute Monocytes 0.4 0.0 - 1.3 10e9/L    Absolute Eosinophils 0.5 0.0 - 0.7 10e9/L    Absolute Basophils 0.0 0.0 - 0.2 10e9/L    Diff Method  Automated Method    TSH with free T4 reflex   Result Value Ref Range    TSH 1.55 0.40 - 4.00 mU/L   Comprehensive metabolic panel   Result Value Ref Range    Sodium 141 133 - 144 mmol/L    Potassium 4.2 3.4 - 5.3 mmol/L    Chloride 109 94 - 109 mmol/L    Carbon Dioxide 26 20 - 32 mmol/L    Anion Gap 6 3 - 14 mmol/L    Glucose 100 (H) 70 - 99 mg/dL    Urea Nitrogen 12 7 - 30 mg/dL    Creatinine 0.59 0.52 - 1.04 mg/dL    GFR Estimate >90 >60 mL/min/[1.73_m2]    GFR Estimate If Black >90 >60 mL/min/[1.73_m2]    Calcium 8.3 (L) 8.5 - 10.1 mg/dL    Bilirubin Total 0.6 0.2 - 1.3 mg/dL    Albumin 3.8 3.4 - 5.0 g/dL    Protein Total 6.5 (L) 6.8 - 8.8 g/dL    Alkaline Phosphatase 54 40 - 150 U/L    ALT 22 0 - 50 U/L    AST 14 0 - 45 U/L   Chlamydia trachomatis PCR   Result Value Ref Range    Specimen Description Urine     Chlamydia Trachomatis PCR Negative NEG^Negative   Neisseria gonorrhoeae PCR   Result Value Ref Range    Specimen Descrip Urine     N Gonorrhea PCR Negative NEG^Negative           Assessment & Plan     Suha was seen today for fatigue, anxiety and std.    Diagnoses and all orders for this visit:    Screen for STD (sexually transmitted disease)  -     Chlamydia trachomatis PCR  -     Neisseria gonorrhoeae PCR  -     Treponema Abs w Reflex to RPR and Titer  -     HIV Antigen Antibody Combo  -     Hepatitis C antibody  -     Herpes Simplex Virus 1 and 2 IgG    Fatigue, unspecified type    ERNESTINA (generalized anxiety disorder)  -     CBC with platelets and differential  -     TSH with free T4 reflex  -     Comprehensive metabolic panel  -     escitalopram (LEXAPRO) 10 MG tablet; Take 1 tablet (10 mg) by mouth daily    Labs today to look for other causes of fatigue and anxiety.  We will contact her with results as they become available  Begin Lexapro 10 mg daily  Melatonin 3 mg at at bedtime as needed for sleep  Psychotherapy recommended  STD prevention strategies reviewed     BMI:   Estimated body mass index is 25.63  "kg/m  as calculated from the following:    Height as of this encounter: 1.651 m (5' 5\").    Weight as of this encounter: 69.9 kg (154 lb).   Weight management plan: Discussed healthy diet and exercise guidelines        Regular exercise  Call or return to the clinic with any worsening of symptoms or no resolution. Patient/Parent verbalized understanding and is in agreement. Medication side effects reviewed.   Current Outpatient Medications   Medication Sig Dispense Refill     Abatacept (ORENCIA) 125 MG/ML SOLN Inject 10 mLs Subcutaneous every 7 days       albuterol (VENTOLIN HFA) 108 (90 BASE) MCG/ACT inhaler Inhale 2 puffs into the lungs every 4 hours as needed 2 puffs every 4 hours for cough or wheeze.  2 puffs 15 minutes before exercise. 1 Inhaler 2     azelastine (ASTELIN) 0.1 % nasal spray Spray 2 sprays into both nostrils 2 times daily as needed for rhinitis 1 Bottle 1     budesonide-formoterol (SYMBICORT) 160-4.5 MCG/ACT Inhaler Inhale 2 puffs into the lungs 2 times daily 1 Inhaler 1     cetirizine (ZYRTEC) 10 MG tablet Take 1 tablet (10 mg) by mouth every evening 30 tablet 1     escitalopram (LEXAPRO) 10 MG tablet Take 1 tablet (10 mg) by mouth daily 31 tablet 2     HYDROcodone-acetaminophen (NORCO) 5-325 MG per tablet Take 1-2 tablets by mouth every 4 hours as needed for moderate to severe pain 15 tablet 0     levonorgestrel (MIRENA) 20 MCG/24HR IUD 1 Device by Intrauterine route       montelukast (SINGULAIR) 10 MG tablet Take 1 tablet (10 mg) by mouth At Bedtime 90 tablet 3     Beclomethasone Dipropionate 80 MCG/ACT AERS Nasal Spray Spray 1 spray into both nostrils daily 8.7 g 1     Chart documentation with Dragon Voice recognition Software. Although reviewed after completion, some words and grammatical errors may remain.    See Patient Instructions    Return in about 3 months (around 12/3/2019) for Mental Health.    JEANNA Porter [facility]      "

## 2019-09-03 NOTE — LETTER
My Asthma Action Plan    Name: Suha Jean   YOB: 1985  Date: 9/3/2019   My doctor: JEANNA Porter CNP   My clinic: [facility]        My Rescue Medicine:   Albuterol inhaler (Proair/Ventolin/Proventil HFA)  2-4 puffs EVERY 4 HOURS as needed. Use a spacer if recommended by your provider.   My Asthma Severity:   Intermittent / Exercise Induced  Know your asthma triggers: Patient is unaware of triggers             GREEN ZONE   Good Control    I feel good    No cough or wheeze    Can work, sleep and play without asthma symptoms       Take your asthma control medicine every day.     1. If exercise triggers your asthma, take your rescue medication    15 minutes before exercise or sports, and    During exercise if you have asthma symptoms  2. Spacer to use with inhaler: If you have a spacer, make sure to use it with your inhaler             YELLOW ZONE Getting Worse  I have ANY of these:    I do not feel good    Cough or wheeze    Chest feels tight    Wake up at night   1. Keep taking your Green Zone medications  2. Start taking your rescue medicine:    every 20 minutes for up to 1 hour. Then every 4 hours for 24-48 hours.  3. If you stay in the Yellow Zone for more than 12-24 hours, contact your doctor.  4. If you do not return to the Green Zone in 12-24 hours or you get worse, start taking your oral steroid medicine if prescribed by your provider.           RED ZONE Medical Alert - Get Help  I have ANY of these:    I feel awful    Medicine is not helping    Breathing getting harder    Trouble walking or talking    Nose opens wide to breathe       1. Take your rescue medicine NOW  2. If your provider has prescribed an oral steroid medicine, start taking it NOW  3. Call your doctor NOW  4. If you are still in the Red Zone after 20 minutes and you have not reached your doctor:    Take your rescue medicine again and    Call 911 or go to the emergency room right away    See  your regular doctor within 2 weeks of an Emergency Room or Urgent Care visit for follow-up treatment.          Annual Reminders:  Meet with Asthma Educator,  Flu Shot in the Fall, consider Pneumonia Vaccination for patients with asthma (aged 19 and older).    Pharmacy:    [id number] - [place], [place] - [address]  [facility] - [place], [place] - [address]                        Asthma Triggers  How To Control Things That Make Your Asthma Worse    Triggers are things that make your asthma worse.  Look at the list below to help you find your triggers and   what you can do about them. You can help prevent asthma flare-ups by staying away from your triggers.      Trigger                                                          What you can do   Cigarette Smoke  Tobacco smoke can make asthma worse. Do not allow smoking in your home, car or around you.  Be sure no one smokes at a child s day care or school.  If you smoke, ask your health care provider for ways to help you quit.  Ask family members to quit too.  Ask your health care provider for a referral to Quit Plan to help you quit smoking, or call 9-591-908-PLAN.     Colds, Flu, Bronchitis  These are common triggers of asthma. Wash your hands often.  Don t touch your eyes, nose or mouth.  Get a flu shot every year.     Dust Mites  These are tiny bugs that live in cloth or carpet. They are too small to see. Wash sheets and blankets in hot water every week.   Encase pillows and mattress in dust mite proof covers.  Avoid having carpet if you can. If you have carpet, vacuum weekly.   Use a dust mask and HEPA vacuum.   Pollen and Outdoor Mold  Some people are allergic to trees, grass, or weed pollen, or molds. Try to keep your windows closed.  Limit time out doors when pollen count is high.   Ask you health care provider about taking medicine during allergy season.     Animal Dander  Some people are allergic to skin flakes,  urine or saliva from pets with fur or feathers. Keep pets with fur or feathers out of your home.    If you can t keep the pet outdoors, then keep the pet out of your bedroom.  Keep the bedroom door closed.  Keep pets off cloth furniture and away from stuffed toys.     Mice, Rats, and Cockroaches  Some people are allergic to the waste from these pests.   Cover food and garbage.  Clean up spills and food crumbs.  Store grease in the refrigerator.   Keep food out of the bedroom.   Indoor Mold  This can be a trigger if your home has high moisture. Fix leaking faucets, pipes, or other sources of water.   Clean moldy surfaces.  Dehumidify basement if it is damp and smelly.   Smoke, Strong Odors, and Sprays  These can reduce air quality. Stay away from strong odors and sprays, such as perfume, powder, hair spray, paints, smoke incense, paint, cleaning products, candles and new carpet.   Exercise or Sports  Some people with asthma have this trigger. Be active!  Ask your doctor about taking medicine before sports or exercise to prevent symptoms.    Warm up for 5-10 minutes before and after sports or exercise.     Other Triggers of Asthma  Cold air:  Cover your nose and mouth with a scarf.  Sometimes laughing or crying can be a trigger.  Some medicines and food can trigger asthma.

## 2019-09-04 ENCOUNTER — MYC MEDICAL ADVICE (OUTPATIENT)
Dept: FAMILY MEDICINE | Facility: CLINIC | Age: 34
End: 2019-09-04

## 2019-09-04 LAB
C TRACH DNA SPEC QL NAA+PROBE: NEGATIVE
HCV AB SERPL QL IA: NONREACTIVE
HIV 1+2 AB+HIV1 P24 AG SERPL QL IA: NONREACTIVE
HSV1 IGG SERPL QL IA: >8 AI (ref 0–0.8)
HSV2 IGG SERPL QL IA: <0.2 AI (ref 0–0.8)
N GONORRHOEA DNA SPEC QL NAA+PROBE: NEGATIVE
SPECIMEN SOURCE: NORMAL
SPECIMEN SOURCE: NORMAL
T PALLIDUM AB SER QL: NONREACTIVE

## 2019-09-04 ASSESSMENT — ANXIETY QUESTIONNAIRES: GAD7 TOTAL SCORE: 6

## 2019-09-04 ASSESSMENT — ASTHMA QUESTIONNAIRES: ACT_TOTALSCORE: 24

## 2019-09-05 NOTE — TELEPHONE ENCOUNTER
This information was faxed to [facility] -[phone]  [facility] [place] [facility]

## 2019-11-08 ENCOUNTER — HEALTH MAINTENANCE LETTER (OUTPATIENT)
Age: 34
End: 2019-11-08

## 2019-11-14 ENCOUNTER — MYC MEDICAL ADVICE (OUTPATIENT)
Dept: FAMILY MEDICINE | Facility: CLINIC | Age: 34
End: 2019-11-14

## 2020-01-03 ENCOUNTER — TELEPHONE (OUTPATIENT)
Dept: FAMILY MEDICINE | Facility: CLINIC | Age: 35
End: 2020-01-03

## 2020-01-03 DIAGNOSIS — Z79.899 ENCOUNTER FOR LONG-TERM (CURRENT) USE OF OTHER MEDICATIONS: ICD-10-CM

## 2020-01-03 DIAGNOSIS — Z79.899 NEED FOR PROPHYLACTIC CHEMOTHERAPY: ICD-10-CM

## 2020-01-03 DIAGNOSIS — M06.89 OTHER SPECIFIED RHEUMATOID ARTHRITIS, MULTIPLE SITES (H): ICD-10-CM

## 2020-01-03 DIAGNOSIS — M06.89 OTHER SPECIFIED RHEUMATOID ARTHRITIS, MULTIPLE SITES (H): Primary | ICD-10-CM

## 2020-01-03 DIAGNOSIS — E55.9 AVITAMINOSIS D: ICD-10-CM

## 2020-01-03 LAB
ALBUMIN SERPL-MCNC: 3.8 G/DL (ref 3.4–5)
ALT SERPL W P-5'-P-CCNC: 29 U/L (ref 0–50)
AST SERPL W P-5'-P-CCNC: 16 U/L (ref 0–45)
CREAT SERPL-MCNC: 0.66 MG/DL (ref 0.52–1.04)
CRP SERPL-MCNC: <2.9 MG/L (ref 0–8)
ERYTHROCYTE [DISTWIDTH] IN BLOOD BY AUTOMATED COUNT: 11.7 % (ref 10–15)
ERYTHROCYTE [SEDIMENTATION RATE] IN BLOOD BY WESTERGREN METHOD: 6 MM/H (ref 0–20)
GFR SERPL CREATININE-BSD FRML MDRD: >90 ML/MIN/{1.73_M2}
HCT VFR BLD AUTO: 38.9 % (ref 35–47)
HGB BLD-MCNC: 13.4 G/DL (ref 11.7–15.7)
MCH RBC QN AUTO: 33 PG (ref 26.5–33)
MCHC RBC AUTO-ENTMCNC: 34.4 G/DL (ref 31.5–36.5)
MCV RBC AUTO: 96 FL (ref 78–100)
PLATELET # BLD AUTO: 216 10E9/L (ref 150–450)
RBC # BLD AUTO: 4.06 10E12/L (ref 3.8–5.2)
WBC # BLD AUTO: 6 10E9/L (ref 4–11)

## 2020-01-03 PROCEDURE — 85027 COMPLETE CBC AUTOMATED: CPT | Performed by: INTERNAL MEDICINE

## 2020-01-03 PROCEDURE — 86140 C-REACTIVE PROTEIN: CPT | Performed by: INTERNAL MEDICINE

## 2020-01-03 PROCEDURE — 36415 COLL VENOUS BLD VENIPUNCTURE: CPT | Performed by: INTERNAL MEDICINE

## 2020-01-03 PROCEDURE — 85652 RBC SED RATE AUTOMATED: CPT | Performed by: INTERNAL MEDICINE

## 2020-01-03 PROCEDURE — 84450 TRANSFERASE (AST) (SGOT): CPT | Performed by: INTERNAL MEDICINE

## 2020-01-03 PROCEDURE — 82040 ASSAY OF SERUM ALBUMIN: CPT | Performed by: INTERNAL MEDICINE

## 2020-01-03 PROCEDURE — 84460 ALANINE AMINO (ALT) (SGPT): CPT | Performed by: INTERNAL MEDICINE

## 2020-01-03 PROCEDURE — 82565 ASSAY OF CREATININE: CPT | Performed by: INTERNAL MEDICINE

## 2020-01-03 NOTE — TELEPHONE ENCOUNTER
Suha is calling to see if we will order a bone scan for her.  They want her primary to order and she has been seen here but goes to an [facility] clinic also.    Kena Rivera  [facility]

## 2020-01-03 NOTE — TELEPHONE ENCOUNTER
Pt has only been seen here once and not for a physical. She needs to be seen to discuss... Please call and schedule an appointment. Lucy Chao RN

## 2020-01-13 ENCOUNTER — MYC MEDICAL ADVICE (OUTPATIENT)
Dept: FAMILY MEDICINE | Facility: CLINIC | Age: 35
End: 2020-01-13

## 2020-01-13 DIAGNOSIS — J45.40 NOT WELL CONTROLLED MODERATE PERSISTENT ASTHMA: ICD-10-CM

## 2020-01-13 DIAGNOSIS — J45.50 SEVERE PERSISTENT ASTHMA (H): ICD-10-CM

## 2020-01-13 RX ORDER — ALBUTEROL SULFATE 90 UG/1
2 AEROSOL, METERED RESPIRATORY (INHALATION) EVERY 4 HOURS PRN
Qty: 1 INHALER | Refills: 2 | Status: CANCELLED | OUTPATIENT
Start: 2020-01-13

## 2020-01-13 RX ORDER — ALBUTEROL SULFATE 90 UG/1
2 AEROSOL, METERED RESPIRATORY (INHALATION) EVERY 6 HOURS PRN
Qty: 6.7 G | Refills: 0 | Status: SHIPPED | OUTPATIENT
Start: 2020-01-13

## 2020-01-13 RX ORDER — BUDESONIDE AND FORMOTEROL FUMARATE DIHYDRATE 160; 4.5 UG/1; UG/1
2 AEROSOL RESPIRATORY (INHALATION) 2 TIMES DAILY
Qty: 1 INHALER | Refills: 1 | Status: CANCELLED | OUTPATIENT
Start: 2020-01-13

## 2020-01-13 NOTE — TELEPHONE ENCOUNTER
"Routing refill request to provider for review/approval because:  Both Rx written by allergy providers.  Has not seen Allergy since 1/8/18 - last appt with Dr. Broussard    Requested Prescriptions   Pending Prescriptions Disp Refills     albuterol (VENTOLIN HFA) 108 (90 Base) MCG/ACT inhaler 1 Inhaler 2     Sig: Inhale 2 puffs into the lungs every 4 hours as needed 2 puffs every 4 hours for cough or wheeze.  2 puffs 15 minutes before exercise.       Asthma Maintenance Inhalers - Anticholinergics Passed - 1/13/2020  8:53 AM        Passed - Patient is age 12 years or older        Passed - Asthma control assessment score within normal limits in last 6 months     Please review ACT score.           Passed - Medication is active on med list        Passed - Recent (6 mo) or future (30 days) visit within the authorizing provider's specialty     Patient had office visit in the last 6 months or has a visit in the next 30 days with authorizing provider or within the authorizing provider's specialty.  See \"Patient Info\" tab in inbasket, or \"Choose Columns\" in Meds & Orders section of the refill encounter.            budesonide-formoterol (SYMBICORT) 160-4.5 MCG/ACT Inhaler 1 Inhaler 1     Sig: Inhale 2 puffs into the lungs 2 times daily       Inhaled Steroids Protocol Passed - 1/13/2020  8:53 AM        Passed - Patient is age 12 or older        Passed - Asthma control assessment score within normal limits in last 6 months     Please review ACT score.           Passed - Medication is active on med list        Passed - Recent (6 mo) or future (30 days) visit within the authorizing provider's specialty     Patient had office visit in the last 6 months or has a visit in the next 30 days with authorizing provider or within the authorizing provider's specialty.  See \"Patient Info\" tab in inbasket, or \"Choose Columns\" in Meds & Orders section of the refill encounter.            albuterol (VENTOLIN HFA) 108 (90 BASE) MCG/ACT inhaler  Last " Written Prescription Date:  1/16/2015 - ordered by Elsi Cadena NP  Last Fill Quantity: 1 inhaler,  # refills: 2   Last office visit: 9/3/2019 with  RUBEN MEEKS   Future Office Visit:      budesonide-formoterol (SYMBICORT) 160-4.5 MCG/ACT Inhaler  Last Written Prescription Date:  4/23/18 by Kofi Broussard MD  Last Fill Quantity: 1 inhaler   # refills: 1   Last office visit: 9/3/2019 with RUBEN MEEKS   Future Office Visit:      ACT Total Scores 1/8/2018 8/14/2018 9/3/2019   ACT TOTAL SCORE - - -   ASTHMA ER VISITS - - -   ASTHMA HOSPITALIZATIONS - - -   ACT TOTAL SCORE (Goal Greater than or Equal to 20) 20 20 24   In the past 12 months, how many times did you visit the emergency room for your asthma without being admitted to the hospital? 0 0 0   In the past 12 months, how many times were you hospitalized overnight because of your asthma? 0 0 0     Latoya RYAN RN

## 2020-01-13 NOTE — TELEPHONE ENCOUNTER
Declined. I have not seen the patient in more than 1 year. She either needs a follow-up appointment or can request further refills from PCP. I saw her in January 2018. 6-8 weeks follow up appointment was recommended. I haven't seen her since then.     Kofi Broussard MD

## 2020-01-13 NOTE — TELEPHONE ENCOUNTER
Notify patient I have filled her Albuterol for 1 inhaler at the [facility]  Has not had Symbicort filled since 2018.  Needs an appointment this week to review current symptoms, formal evaluation and develop a current treatment plan. treatment plan.     Amanda Vang CNP

## 2020-01-13 NOTE — TELEPHONE ENCOUNTER
Will route to PCP per Dr Broussard as he has not seen her in over 2 years. Please see his note below.     Lucero KLEIN   [facility]

## 2020-04-27 ENCOUNTER — MYC MEDICAL ADVICE (OUTPATIENT)
Dept: FAMILY MEDICINE | Facility: CLINIC | Age: 35
End: 2020-04-27

## 2020-04-29 DIAGNOSIS — M06.89 OTHER SPECIFIED RHEUMATOID ARTHRITIS, MULTIPLE SITES (H): ICD-10-CM

## 2020-04-29 DIAGNOSIS — Z79.899 ENCOUNTER FOR LONG-TERM (CURRENT) USE OF OTHER MEDICATIONS: ICD-10-CM

## 2020-04-29 DIAGNOSIS — E55.9 AVITAMINOSIS D: ICD-10-CM

## 2020-04-29 LAB
ALBUMIN SERPL-MCNC: 3.9 G/DL (ref 3.4–5)
ALT SERPL W P-5'-P-CCNC: 28 U/L (ref 0–50)
AST SERPL W P-5'-P-CCNC: 15 U/L (ref 0–45)
CREAT SERPL-MCNC: 0.63 MG/DL (ref 0.52–1.04)
CRP SERPL-MCNC: <2.9 MG/L (ref 0–8)
ERYTHROCYTE [DISTWIDTH] IN BLOOD BY AUTOMATED COUNT: 11.7 % (ref 10–15)
ERYTHROCYTE [SEDIMENTATION RATE] IN BLOOD BY WESTERGREN METHOD: 5 MM/H (ref 0–20)
GFR SERPL CREATININE-BSD FRML MDRD: >90 ML/MIN/{1.73_M2}
HCT VFR BLD AUTO: 42.8 % (ref 35–47)
HGB BLD-MCNC: 14.6 G/DL (ref 11.7–15.7)
MCH RBC QN AUTO: 32.6 PG (ref 26.5–33)
MCHC RBC AUTO-ENTMCNC: 34.1 G/DL (ref 31.5–36.5)
MCV RBC AUTO: 96 FL (ref 78–100)
PLATELET # BLD AUTO: 219 10E9/L (ref 150–450)
RBC # BLD AUTO: 4.48 10E12/L (ref 3.8–5.2)
WBC # BLD AUTO: 6.7 10E9/L (ref 4–11)

## 2020-04-29 PROCEDURE — 82565 ASSAY OF CREATININE: CPT | Performed by: INTERNAL MEDICINE

## 2020-04-29 PROCEDURE — 84450 TRANSFERASE (AST) (SGOT): CPT | Performed by: INTERNAL MEDICINE

## 2020-04-29 PROCEDURE — 84460 ALANINE AMINO (ALT) (SGPT): CPT | Performed by: INTERNAL MEDICINE

## 2020-04-29 PROCEDURE — 86140 C-REACTIVE PROTEIN: CPT | Performed by: INTERNAL MEDICINE

## 2020-04-29 PROCEDURE — 85027 COMPLETE CBC AUTOMATED: CPT | Performed by: INTERNAL MEDICINE

## 2020-04-29 PROCEDURE — 82040 ASSAY OF SERUM ALBUMIN: CPT | Performed by: INTERNAL MEDICINE

## 2020-04-29 PROCEDURE — 36415 COLL VENOUS BLD VENIPUNCTURE: CPT | Performed by: INTERNAL MEDICINE

## 2020-04-29 PROCEDURE — 85652 RBC SED RATE AUTOMATED: CPT | Performed by: INTERNAL MEDICINE

## 2020-12-06 ENCOUNTER — HEALTH MAINTENANCE LETTER (OUTPATIENT)
Age: 35
End: 2020-12-06

## 2021-05-29 ENCOUNTER — RECORDS - HEALTHEAST (OUTPATIENT)
Dept: ADMINISTRATIVE | Facility: CLINIC | Age: 36
End: 2021-05-29

## 2021-05-30 ENCOUNTER — RECORDS - HEALTHEAST (OUTPATIENT)
Dept: ADMINISTRATIVE | Facility: CLINIC | Age: 36
End: 2021-05-30

## 2021-09-25 ENCOUNTER — HEALTH MAINTENANCE LETTER (OUTPATIENT)
Age: 36
End: 2021-09-25

## 2021-12-23 ENCOUNTER — MEDICAL CORRESPONDENCE (OUTPATIENT)
Dept: NEUROSURGERY | Facility: CLINIC | Age: 36
End: 2021-12-23
Payer: COMMERCIAL

## 2021-12-23 DIAGNOSIS — M54.2 NECK PAIN: Primary | ICD-10-CM

## 2022-01-15 ENCOUNTER — HEALTH MAINTENANCE LETTER (OUTPATIENT)
Age: 37
End: 2022-01-15

## 2022-07-01 ENCOUNTER — LAB REQUISITION (OUTPATIENT)
Dept: LAB | Facility: CLINIC | Age: 37
End: 2022-07-01

## 2022-07-01 LAB
ALBUMIN SERPL BCG-MCNC: 4.6 G/DL (ref 3.5–5.2)
ALP SERPL-CCNC: 61 U/L (ref 35–104)
ALT SERPL W P-5'-P-CCNC: 22 U/L (ref 10–35)
ANION GAP SERPL CALCULATED.3IONS-SCNC: 10 MMOL/L (ref 7–15)
AST SERPL W P-5'-P-CCNC: 17 U/L (ref 10–35)
BILIRUB SERPL-MCNC: 0.7 MG/DL
BUN SERPL-MCNC: 9.1 MG/DL (ref 6–20)
CALCIUM SERPL-MCNC: 9.3 MG/DL (ref 8.6–10)
CHLORIDE SERPL-SCNC: 104 MMOL/L (ref 98–107)
CHOLEST SERPL-MCNC: 158 MG/DL
CK SERPL-CCNC: 60 U/L (ref 26–192)
CREAT SERPL-MCNC: 0.78 MG/DL (ref 0.51–0.95)
DEPRECATED HCO3 PLAS-SCNC: 26 MMOL/L (ref 22–29)
FERRITIN SERPL-MCNC: 481 NG/ML (ref 6–175)
GFR SERPL CREATININE-BSD FRML MDRD: >90 ML/MIN/1.73M2
GGT SERPL-CCNC: 23 U/L (ref 5–36)
GLUCOSE SERPL-MCNC: 95 MG/DL (ref 70–99)
HDLC SERPL-MCNC: 76 MG/DL
IRON SERPL-MCNC: 128 UG/DL (ref 35–180)
LDH SERPL L TO P-CCNC: 125 U/L (ref 0–250)
LDLC SERPL CALC-MCNC: 72 MG/DL
MAGNESIUM SERPL-MCNC: 2 MG/DL (ref 1.7–2.3)
NONHDLC SERPL-MCNC: 82 MG/DL
PHOSPHATE SERPL-MCNC: 4.1 MG/DL (ref 2.5–4.5)
POTASSIUM SERPL-SCNC: 4.7 MMOL/L (ref 3.4–5.3)
PROT SERPL-MCNC: 6.4 G/DL (ref 6.4–8.3)
SODIUM SERPL-SCNC: 140 MMOL/L (ref 136–145)
TRIGL SERPL-MCNC: 50 MG/DL
TSH SERPL DL<=0.005 MIU/L-ACNC: 1.97 UIU/ML (ref 0.3–4.2)
URATE SERPL-MCNC: 4 MG/DL (ref 2.4–5.7)

## 2022-07-01 PROCEDURE — 82977 ASSAY OF GGT: CPT | Performed by: CHIROPRACTOR

## 2022-07-01 PROCEDURE — 84443 ASSAY THYROID STIM HORMONE: CPT | Performed by: CHIROPRACTOR

## 2022-07-01 PROCEDURE — 84550 ASSAY OF BLOOD/URIC ACID: CPT | Performed by: CHIROPRACTOR

## 2022-07-01 PROCEDURE — 84100 ASSAY OF PHOSPHORUS: CPT | Performed by: CHIROPRACTOR

## 2022-07-01 PROCEDURE — 82550 ASSAY OF CK (CPK): CPT | Performed by: CHIROPRACTOR

## 2022-07-01 PROCEDURE — 82306 VITAMIN D 25 HYDROXY: CPT | Performed by: CHIROPRACTOR

## 2022-07-01 PROCEDURE — 83735 ASSAY OF MAGNESIUM: CPT | Performed by: CHIROPRACTOR

## 2022-07-01 PROCEDURE — 82728 ASSAY OF FERRITIN: CPT | Performed by: CHIROPRACTOR

## 2022-07-01 PROCEDURE — 80061 LIPID PANEL: CPT | Performed by: CHIROPRACTOR

## 2022-07-01 PROCEDURE — 83540 ASSAY OF IRON: CPT | Performed by: CHIROPRACTOR

## 2022-07-01 PROCEDURE — 83615 LACTATE (LD) (LDH) ENZYME: CPT | Performed by: CHIROPRACTOR

## 2022-07-01 PROCEDURE — 80053 COMPREHEN METABOLIC PANEL: CPT | Performed by: CHIROPRACTOR

## 2022-07-02 LAB — DEPRECATED CALCIDIOL+CALCIFEROL SERPL-MC: 63 UG/L (ref 20–75)

## 2023-04-22 ENCOUNTER — HEALTH MAINTENANCE LETTER (OUTPATIENT)
Age: 38
End: 2023-04-22

## 2024-02-10 ENCOUNTER — HEALTH MAINTENANCE LETTER (OUTPATIENT)
Age: 39
End: 2024-02-10